# Patient Record
Sex: MALE | NOT HISPANIC OR LATINO
[De-identification: names, ages, dates, MRNs, and addresses within clinical notes are randomized per-mention and may not be internally consistent; named-entity substitution may affect disease eponyms.]

---

## 2017-02-10 ENCOUNTER — RX RENEWAL (OUTPATIENT)
Age: 42
End: 2017-02-10

## 2017-02-10 ENCOUNTER — MEDICATION RENEWAL (OUTPATIENT)
Age: 42
End: 2017-02-10

## 2017-08-14 ENCOUNTER — RX RENEWAL (OUTPATIENT)
Age: 42
End: 2017-08-14

## 2017-08-29 ENCOUNTER — OUTPATIENT (OUTPATIENT)
Dept: OUTPATIENT SERVICES | Facility: HOSPITAL | Age: 42
LOS: 1 days | End: 2017-08-29
Payer: SELF-PAY

## 2017-08-29 ENCOUNTER — LABORATORY RESULT (OUTPATIENT)
Age: 42
End: 2017-08-29

## 2017-08-29 ENCOUNTER — APPOINTMENT (OUTPATIENT)
Dept: INTERNAL MEDICINE | Facility: CLINIC | Age: 42
End: 2017-08-29

## 2017-08-29 VITALS
OXYGEN SATURATION: 99 % | BODY MASS INDEX: 21.97 KG/M2 | HEART RATE: 91 BPM | HEIGHT: 67 IN | SYSTOLIC BLOOD PRESSURE: 136 MMHG | DIASTOLIC BLOOD PRESSURE: 86 MMHG | TEMPERATURE: 98.1 F | RESPIRATION RATE: 18 BRPM | WEIGHT: 140 LBS

## 2017-08-29 DIAGNOSIS — Z00.00 ENCOUNTER FOR GENERAL ADULT MEDICAL EXAMINATION WITHOUT ABNORMAL FINDINGS: ICD-10-CM

## 2017-08-29 PROCEDURE — G0463: CPT

## 2017-08-30 DIAGNOSIS — R13.10 DYSPHAGIA, UNSPECIFIED: ICD-10-CM

## 2017-08-30 DIAGNOSIS — R63.4 ABNORMAL WEIGHT LOSS: ICD-10-CM

## 2017-08-30 DIAGNOSIS — I10 ESSENTIAL (PRIMARY) HYPERTENSION: ICD-10-CM

## 2017-08-30 LAB
25(OH)D3 SERPL-MCNC: 30.4 NG/ML
ALBUMIN SERPL ELPH-MCNC: 4.2 G/DL
ALP BLD-CCNC: 105 U/L
ALT SERPL-CCNC: 58 U/L
ANION GAP SERPL CALC-SCNC: 16 MMOL/L
APPEARANCE: CLEAR
AST SERPL-CCNC: 34 U/L
BASOPHILS # BLD AUTO: 0.01 K/UL
BASOPHILS NFR BLD AUTO: 0.2 %
BILIRUB SERPL-MCNC: 1.1 MG/DL
BILIRUBIN URINE: NEGATIVE
BLOOD URINE: NEGATIVE
BUN SERPL-MCNC: 10 MG/DL
CALCIUM SERPL-MCNC: 10.3 MG/DL
CHLORIDE SERPL-SCNC: 100 MMOL/L
CHOLEST SERPL-MCNC: 81 MG/DL
CHOLEST/HDLC SERPL: 2.3 RATIO
CO2 SERPL-SCNC: 25 MMOL/L
COLOR: YELLOW
CREAT SERPL-MCNC: 0.61 MG/DL
EOSINOPHIL # BLD AUTO: 0.26 K/UL
EOSINOPHIL NFR BLD AUTO: 4.2 %
GLUCOSE QUALITATIVE U: NORMAL MG/DL
GLUCOSE SERPL-MCNC: 95 MG/DL
HBA1C MFR BLD HPLC: 5.5 %
HCT VFR BLD CALC: 45 %
HDLC SERPL-MCNC: 35 MG/DL
HGB BLD-MCNC: 14.5 G/DL
IMM GRANULOCYTES NFR BLD AUTO: 0.2 %
KETONES URINE: NEGATIVE
LDLC SERPL CALC-MCNC: 27 MG/DL
LEUKOCYTE ESTERASE URINE: NEGATIVE
LYMPHOCYTES # BLD AUTO: 2.24 K/UL
LYMPHOCYTES NFR BLD AUTO: 36.2 %
MAN DIFF?: NORMAL
MCHC RBC-ENTMCNC: 27.5 PG
MCHC RBC-ENTMCNC: 32.2 GM/DL
MCV RBC AUTO: 85.2 FL
MONOCYTES # BLD AUTO: 0.69 K/UL
MONOCYTES NFR BLD AUTO: 11.2 %
NEUTROPHILS # BLD AUTO: 2.97 K/UL
NEUTROPHILS NFR BLD AUTO: 48 %
NITRITE URINE: NEGATIVE
PH URINE: 6.5
PLATELET # BLD AUTO: 309 K/UL
POTASSIUM SERPL-SCNC: 5 MMOL/L
PROT SERPL-MCNC: 7.6 G/DL
PROTEIN URINE: NEGATIVE MG/DL
RBC # BLD: 5.28 M/UL
RBC # FLD: 12.7 %
SODIUM SERPL-SCNC: 141 MMOL/L
SPECIFIC GRAVITY URINE: 1.02
T3 SERPL-MCNC: >650 NG/DL
T4 FREE SERPL-MCNC: >7.8 NG/DL
TRIGL SERPL-MCNC: 93 MG/DL
TSH SERPL-ACNC: <0.01 UIU/ML
UROBILINOGEN URINE: NORMAL MG/DL
WBC # FLD AUTO: 6.18 K/UL

## 2017-08-31 ENCOUNTER — APPOINTMENT (OUTPATIENT)
Dept: INTERNAL MEDICINE | Facility: CLINIC | Age: 42
End: 2017-08-31

## 2017-09-13 ENCOUNTER — APPOINTMENT (OUTPATIENT)
Dept: CARDIOLOGY | Facility: CLINIC | Age: 42
End: 2017-09-13
Payer: COMMERCIAL

## 2017-09-13 ENCOUNTER — OUTPATIENT (OUTPATIENT)
Dept: OUTPATIENT SERVICES | Facility: HOSPITAL | Age: 42
LOS: 1 days | End: 2017-09-13
Payer: SELF-PAY

## 2017-09-13 VITALS
WEIGHT: 135 LBS | OXYGEN SATURATION: 99 % | DIASTOLIC BLOOD PRESSURE: 69 MMHG | BODY MASS INDEX: 21.19 KG/M2 | HEART RATE: 76 BPM | RESPIRATION RATE: 16 BRPM | SYSTOLIC BLOOD PRESSURE: 107 MMHG | HEIGHT: 67 IN

## 2017-09-13 DIAGNOSIS — Z00.8 ENCOUNTER FOR OTHER GENERAL EXAMINATION: ICD-10-CM

## 2017-09-13 PROCEDURE — G0463: CPT | Mod: 25

## 2017-09-13 PROCEDURE — ZZZZZ: CPT

## 2017-09-14 DIAGNOSIS — I25.10 ATHEROSCLEROTIC HEART DISEASE OF NATIVE CORONARY ARTERY WITHOUT ANGINA PECTORIS: ICD-10-CM

## 2017-09-14 DIAGNOSIS — E78.5 HYPERLIPIDEMIA, UNSPECIFIED: ICD-10-CM

## 2017-09-14 DIAGNOSIS — E05.90 THYROTOXICOSIS, UNSPECIFIED WITHOUT THYROTOXIC CRISIS OR STORM: ICD-10-CM

## 2017-09-14 DIAGNOSIS — R63.4 ABNORMAL WEIGHT LOSS: ICD-10-CM

## 2017-09-14 DIAGNOSIS — I10 ESSENTIAL (PRIMARY) HYPERTENSION: ICD-10-CM

## 2017-09-15 ENCOUNTER — EMERGENCY (EMERGENCY)
Facility: HOSPITAL | Age: 42
LOS: 1 days | Discharge: HOME | End: 2017-09-15
Attending: EMERGENCY MEDICINE | Admitting: INTERNAL MEDICINE
Payer: SELF-PAY

## 2017-09-15 VITALS
SYSTOLIC BLOOD PRESSURE: 116 MMHG | DIASTOLIC BLOOD PRESSURE: 66 MMHG | TEMPERATURE: 98 F | HEART RATE: 91 BPM | RESPIRATION RATE: 18 BRPM | OXYGEN SATURATION: 99 %

## 2017-09-15 PROCEDURE — 99285 EMERGENCY DEPT VISIT HI MDM: CPT | Mod: 25

## 2017-09-15 PROCEDURE — 93010 ELECTROCARDIOGRAM REPORT: CPT | Mod: 76

## 2017-09-15 NOTE — ED ADULT TRIAGE NOTE - CHIEF COMPLAINT QUOTE
HX of ANGIOPLASTY with Stents. Pt st" I was just tx of hyperthyroidism....tonight I started to sweat a lot and felt short of breath....I felt a chest tight ness around 9:30pm. Better now."

## 2017-09-16 DIAGNOSIS — E05.90 THYROTOXICOSIS, UNSPECIFIED WITHOUT THYROTOXIC CRISIS OR STORM: ICD-10-CM

## 2017-09-16 DIAGNOSIS — R07.9 CHEST PAIN, UNSPECIFIED: ICD-10-CM

## 2017-09-16 DIAGNOSIS — I10 ESSENTIAL (PRIMARY) HYPERTENSION: ICD-10-CM

## 2017-09-16 DIAGNOSIS — Z29.9 ENCOUNTER FOR PROPHYLACTIC MEASURES, UNSPECIFIED: ICD-10-CM

## 2017-09-16 LAB
ALBUMIN SERPL ELPH-MCNC: 3.7 G/DL — SIGNIFICANT CHANGE UP (ref 3.3–5)
ALP SERPL-CCNC: 102 U/L — SIGNIFICANT CHANGE UP (ref 40–120)
ALT FLD-CCNC: 61 U/L — HIGH (ref 4–41)
APTT BLD: 35.5 SEC — SIGNIFICANT CHANGE UP (ref 27.5–37.4)
AST SERPL-CCNC: 31 U/L — SIGNIFICANT CHANGE UP (ref 4–40)
BASOPHILS # BLD AUTO: 0.01 K/UL — SIGNIFICANT CHANGE UP (ref 0–0.2)
BASOPHILS # BLD AUTO: 0.02 K/UL — SIGNIFICANT CHANGE UP (ref 0–0.2)
BASOPHILS NFR BLD AUTO: 0.2 % — SIGNIFICANT CHANGE UP (ref 0–2)
BASOPHILS NFR BLD AUTO: 0.3 % — SIGNIFICANT CHANGE UP (ref 0–2)
BILIRUB SERPL-MCNC: 0.7 MG/DL — SIGNIFICANT CHANGE UP (ref 0.2–1.2)
BUN SERPL-MCNC: 13 MG/DL — SIGNIFICANT CHANGE UP (ref 7–23)
CALCIUM SERPL-MCNC: 9.4 MG/DL — SIGNIFICANT CHANGE UP (ref 8.4–10.5)
CHLORIDE SERPL-SCNC: 101 MMOL/L — SIGNIFICANT CHANGE UP (ref 98–107)
CK MB BLD-MCNC: 1.19 NG/ML — SIGNIFICANT CHANGE UP (ref 1–6.6)
CK MB BLD-MCNC: 1.39 NG/ML — SIGNIFICANT CHANGE UP (ref 1–6.6)
CK SERPL-CCNC: 53 U/L — SIGNIFICANT CHANGE UP (ref 30–200)
CK SERPL-CCNC: 58 U/L — SIGNIFICANT CHANGE UP (ref 30–200)
CK SERPL-CCNC: 91 U/L — SIGNIFICANT CHANGE UP (ref 30–200)
CO2 SERPL-SCNC: 29 MMOL/L — SIGNIFICANT CHANGE UP (ref 22–31)
CREAT SERPL-MCNC: 0.48 MG/DL — LOW (ref 0.5–1.3)
EOSINOPHIL # BLD AUTO: 0.36 K/UL — SIGNIFICANT CHANGE UP (ref 0–0.5)
EOSINOPHIL # BLD AUTO: 0.4 K/UL — SIGNIFICANT CHANGE UP (ref 0–0.5)
EOSINOPHIL NFR BLD AUTO: 5.1 % — SIGNIFICANT CHANGE UP (ref 0–6)
EOSINOPHIL NFR BLD AUTO: 7.3 % — HIGH (ref 0–6)
GLUCOSE SERPL-MCNC: 99 MG/DL — SIGNIFICANT CHANGE UP (ref 70–99)
HCT VFR BLD CALC: 39.2 % — SIGNIFICANT CHANGE UP (ref 39–50)
HCT VFR BLD CALC: 40.3 % — SIGNIFICANT CHANGE UP (ref 39–50)
HGB BLD-MCNC: 12.9 G/DL — LOW (ref 13–17)
HGB BLD-MCNC: 13.5 G/DL — SIGNIFICANT CHANGE UP (ref 13–17)
IMM GRANULOCYTES # BLD AUTO: 0 # — SIGNIFICANT CHANGE UP
IMM GRANULOCYTES # BLD AUTO: 0.01 # — SIGNIFICANT CHANGE UP
IMM GRANULOCYTES NFR BLD AUTO: 0 % — SIGNIFICANT CHANGE UP (ref 0–1.5)
IMM GRANULOCYTES NFR BLD AUTO: 0.1 % — SIGNIFICANT CHANGE UP (ref 0–1.5)
INR BLD: 1.05 — SIGNIFICANT CHANGE UP (ref 0.88–1.17)
LYMPHOCYTES # BLD AUTO: 1.8 K/UL — SIGNIFICANT CHANGE UP (ref 1–3.3)
LYMPHOCYTES # BLD AUTO: 2.6 K/UL — SIGNIFICANT CHANGE UP (ref 1–3.3)
LYMPHOCYTES # BLD AUTO: 33 % — SIGNIFICANT CHANGE UP (ref 13–44)
LYMPHOCYTES # BLD AUTO: 36.6 % — SIGNIFICANT CHANGE UP (ref 13–44)
MCHC RBC-ENTMCNC: 27.7 PG — SIGNIFICANT CHANGE UP (ref 27–34)
MCHC RBC-ENTMCNC: 28.2 PG — SIGNIFICANT CHANGE UP (ref 27–34)
MCHC RBC-ENTMCNC: 32.9 % — SIGNIFICANT CHANGE UP (ref 32–36)
MCHC RBC-ENTMCNC: 33.5 % — SIGNIFICANT CHANGE UP (ref 32–36)
MCV RBC AUTO: 84.1 FL — SIGNIFICANT CHANGE UP (ref 80–100)
MCV RBC AUTO: 84.3 FL — SIGNIFICANT CHANGE UP (ref 80–100)
MONOCYTES # BLD AUTO: 0.7 K/UL — SIGNIFICANT CHANGE UP (ref 0–0.9)
MONOCYTES # BLD AUTO: 0.86 K/UL — SIGNIFICANT CHANGE UP (ref 0–0.9)
MONOCYTES NFR BLD AUTO: 12.1 % — SIGNIFICANT CHANGE UP (ref 2–14)
MONOCYTES NFR BLD AUTO: 12.8 % — SIGNIFICANT CHANGE UP (ref 2–14)
NEUTROPHILS # BLD AUTO: 2.54 K/UL — SIGNIFICANT CHANGE UP (ref 1.8–7.4)
NEUTROPHILS # BLD AUTO: 3.26 K/UL — SIGNIFICANT CHANGE UP (ref 1.8–7.4)
NEUTROPHILS NFR BLD AUTO: 45.8 % — SIGNIFICANT CHANGE UP (ref 43–77)
NEUTROPHILS NFR BLD AUTO: 46.7 % — SIGNIFICANT CHANGE UP (ref 43–77)
NRBC # FLD: 0 — SIGNIFICANT CHANGE UP
NRBC # FLD: 0 — SIGNIFICANT CHANGE UP
NT-PROBNP SERPL-SCNC: 103.9 PG/ML — SIGNIFICANT CHANGE UP
PLATELET # BLD AUTO: 222 K/UL — SIGNIFICANT CHANGE UP (ref 150–400)
PLATELET # BLD AUTO: 236 K/UL — SIGNIFICANT CHANGE UP (ref 150–400)
PMV BLD: 10 FL — SIGNIFICANT CHANGE UP (ref 7–13)
PMV BLD: 9.8 FL — SIGNIFICANT CHANGE UP (ref 7–13)
POTASSIUM SERPL-MCNC: 4.7 MMOL/L — SIGNIFICANT CHANGE UP (ref 3.5–5.3)
POTASSIUM SERPL-SCNC: 4.7 MMOL/L — SIGNIFICANT CHANGE UP (ref 3.5–5.3)
PROT SERPL-MCNC: 6.7 G/DL — SIGNIFICANT CHANGE UP (ref 6–8.3)
PROTHROM AB SERPL-ACNC: 11.8 SEC — SIGNIFICANT CHANGE UP (ref 9.8–13.1)
RBC # BLD: 4.66 M/UL — SIGNIFICANT CHANGE UP (ref 4.2–5.8)
RBC # BLD: 4.78 M/UL — SIGNIFICANT CHANGE UP (ref 4.2–5.8)
RBC # FLD: 12.3 % — SIGNIFICANT CHANGE UP (ref 10.3–14.5)
RBC # FLD: 12.3 % — SIGNIFICANT CHANGE UP (ref 10.3–14.5)
SODIUM SERPL-SCNC: 139 MMOL/L — SIGNIFICANT CHANGE UP (ref 135–145)
T3 SERPL-MCNC: 583 NG/DL — HIGH (ref 80–200)
T4 FREE SERPL-MCNC: 7.57 NG/DL — HIGH (ref 0.9–1.8)
TROPONIN T SERPL-MCNC: < 0.06 NG/ML — SIGNIFICANT CHANGE UP (ref 0–0.06)
TSH SERPL-MCNC: 0.01 UIU/ML — LOW (ref 0.27–4.2)
WBC # BLD: 5.45 K/UL — SIGNIFICANT CHANGE UP (ref 3.8–10.5)
WBC # BLD: 7.11 K/UL — SIGNIFICANT CHANGE UP (ref 3.8–10.5)
WBC # FLD AUTO: 5.45 K/UL — SIGNIFICANT CHANGE UP (ref 3.8–10.5)
WBC # FLD AUTO: 7.11 K/UL — SIGNIFICANT CHANGE UP (ref 3.8–10.5)

## 2017-09-16 PROCEDURE — 93306 TTE W/DOPPLER COMPLETE: CPT | Mod: 26

## 2017-09-16 RX ORDER — ASPIRIN/CALCIUM CARB/MAGNESIUM 324 MG
162 TABLET ORAL ONCE
Qty: 0 | Refills: 0 | Status: COMPLETED | OUTPATIENT
Start: 2017-09-16 | End: 2017-09-16

## 2017-09-16 RX ORDER — METOPROLOL TARTRATE 50 MG
25 TABLET ORAL EVERY 12 HOURS
Qty: 0 | Refills: 0 | Status: DISCONTINUED | OUTPATIENT
Start: 2017-09-16 | End: 2017-09-16

## 2017-09-16 RX ORDER — ATORVASTATIN CALCIUM 80 MG/1
20 TABLET, FILM COATED ORAL AT BEDTIME
Qty: 0 | Refills: 0 | Status: DISCONTINUED | OUTPATIENT
Start: 2017-09-16 | End: 2017-09-17

## 2017-09-16 RX ORDER — ASPIRIN/CALCIUM CARB/MAGNESIUM 324 MG
81 TABLET ORAL DAILY
Qty: 0 | Refills: 0 | Status: DISCONTINUED | OUTPATIENT
Start: 2017-09-16 | End: 2017-09-17

## 2017-09-16 RX ORDER — METOPROLOL TARTRATE 50 MG
50 TABLET ORAL
Qty: 0 | Refills: 0 | Status: DISCONTINUED | OUTPATIENT
Start: 2017-09-16 | End: 2017-09-16

## 2017-09-16 RX ORDER — METOPROLOL TARTRATE 50 MG
25 TABLET ORAL
Qty: 0 | Refills: 0 | Status: DISCONTINUED | OUTPATIENT
Start: 2017-09-16 | End: 2017-09-17

## 2017-09-16 RX ORDER — ASPIRIN/CALCIUM CARB/MAGNESIUM 324 MG
325 TABLET ORAL ONCE
Qty: 0 | Refills: 0 | Status: DISCONTINUED | OUTPATIENT
Start: 2017-09-16 | End: 2017-09-16

## 2017-09-16 RX ADMIN — Medication 25 MILLIGRAM(S): at 17:51

## 2017-09-16 RX ADMIN — ATORVASTATIN CALCIUM 20 MILLIGRAM(S): 80 TABLET, FILM COATED ORAL at 22:31

## 2017-09-16 RX ADMIN — Medication 162 MILLIGRAM(S): at 01:22

## 2017-09-16 RX ADMIN — Medication 81 MILLIGRAM(S): at 11:57

## 2017-09-16 NOTE — H&P ADULT - HISTORY OF PRESENT ILLNESS
This is a 43 yo m PMH HTN, CAD s/p stent x 1 2013, recent diagnosis of hyperthyroidism PW mid sternal chest pain that began after eating dinner while driving his car last PM. Pt states pain felt like a band around his chest 5/10 with mild SOB, diaphoresis and palpitations. Pt states he went to his apartment where he took his metoprolol and atorvastatin. Pt's pain was unchanged and gradually disappeared after thirty minutes. Pt presently is pain free without complaints. Pt denies any previous episodes of chest pain since stent placement. Pt states he was diagnosed with hyperthyroidism two weeks ago and was to see an endocrinologist in October. Pt states he has been having increased heart rate to low 100's and palpitations and thirty  pound weight lose starting in June of this year.

## 2017-09-16 NOTE — H&P ADULT - ASSESSMENT
This is a 41 yo PMH Htn, CAD, hyperthyroidism who presents with mid sternal chest pain for 30 min last night. Pt's pain was relived after 30 min.

## 2017-09-16 NOTE — CONSULT NOTE ADULT - ASSESSMENT
43 y/o male with acute thyrotoxicosis, tachycardia, HTN, HLD, and history of CAD s/p 1 stent here with c/o chest pain, palpitations, and diaphoresis.
42yoM with PMHx CAD with stents, HTN and hyperthryoidism (awaiting treatment by endocrinologist) p.w chest tightness a/w SOB and diaphoresis lasting for 20-30 minutes.

## 2017-09-16 NOTE — H&P ADULT - NSHPLABSRESULTS_GEN_ALL_CORE
EKG NSR no INA EKG NSR no INA    < from: Transthoracic Echocardiogram (09.16.17 @ 07:41) >    Patient name: LIANA SANTA  YOB: 1975   Age: 42 (M)   MR#: [id number]  Study Date: 9/16/2017  [facility]  Study quality: Technically good  Referring Physician: Alonzo Chávez MD  Blood Pressure: 118/75 mmHg  Height: 170 cm  Weight: 61 kg  BSA: 1.7 m2  ------------------------------------------------------------------------  PROCEDURE: Transthoracic echocardiogram with 2-D, M-Mode  and complete spectral and color flow Doppler.  INDICATION: Chest pain, unspecified (R07.9)  ------------------------------------------------------------------------  DIMENSIONS:  Dimensions:     Normal Values:  LA:     3.3 cm    2.0 - 4.0 cm  Ao:     3.4 cm    2.0 - 3.8 cm  SEPTUM: 1.1 cm    0.6 - 1.2 cm  PWT:    1.0 cm    0.6 - 1.1 cm  LVIDd:  4.3 cm    3.0 - 5.6 cm  LVIDs:  2.4 cm    1.8 - 4.0 cm  Derived Variables:  LVMI: 89 g/m2  RWT: 0.46  Fractional short: 44 %  Ejection Fraction (Teicholtz): 76 %  ------------------------------------------------------------------------  OBSERVATIONS:  Mitral Valve: Normal mitral valve.  Aortic Root: Normal aortic root.  Aortic Valve: Normal trileaflet aortic valve.  Left Atrium: Normal left atrium.  LA volume index = 34  cc/m2.  Left Ventricle: Normal left ventricularsystolic function.  No segmental wall motion abnormalities. Normal left  ventricular internal dimensions and wall thicknesses.  Right Heart: Normal right atrium. Normal right ventricular  size and function. Normal tricuspid valve. Normal pulmonic  valve.  Pericardium/PleuraNormal pericardium with no pericardial  effusion.  ------------------------------------------------------------------------  CONCLUSIONS:  1. Normal left ventricular internal dimensions and wall  thicknesses.  2. Normal left ventricular systolic function. No segmental  wall motion abnormalities.  3. Normal right ventricular size and function.  *** No previous Echo exam.  ------------------------------------------------------------------------  Confirmed on  9/16/2017 - 10:16:58 by Alonzo Chávez MD, Merged with [facility],  NATASHA, VI  ------------------------------------------------------------------------    < end of copied text >

## 2017-09-16 NOTE — CONSULT NOTE ADULT - PROBLEM SELECTOR RECOMMENDATION 9
Given patient is chest pain free and initial symptoms were self limiting but has history of CAD would recommend to trend CE   EKG if chest pain   Some of symptoms consistent with hyperthyroidism, follow up with endocrinologist   TTE in AM to evaluate for structural abnormalities TTE to assess LV function given hyperthyroid state.    Endocrine evaluation

## 2017-09-16 NOTE — CONSULT NOTE ADULT - SUBJECTIVE AND OBJECTIVE BOX
Reason For Consult: Hyperthyroidism    HPI: This is a 41 yo m PMH HTN, CAD s/p stent x 1 2013, recent diagnosis of hyperthyroidism PW mid sternal chest pain that began after eating dinner while driving his car last PM. Pt states pain felt like a band around his chest 5/10 with mild SOB, diaphoresis and palpitations. Pt states he went to his apartment where he took his metoprolol and atorvastatin. Pt's pain was unchanged and gradually disappeared after thirty minutes. Pt presently is pain free without complaints. Pt denies any previous episodes of chest pain since stent placement. Pt states he was diagnosed with hyperthyroidism two weeks ago and was to see an endocrinologist in October. Pt states he has been having increased heart rate to low 100's and palpitations and thirty  pound weight lose starting in June of this year. (16 Sep 2017 08:41)    Endocrine History: 43 y/o male with recently diagnosed hyperthyroidism by PMD presented to [facility] with complaints of chest pain, palpitations, SOB, diaphoresis. He complained of 30 lbs weight loss since May 2017 with tremors, occasional palpitations, frequent BM and PMD diagnosed him with hyperthyroidism based on labs and was advised to schedule an endocrinology appointment. He scheduled an appointment with an endocrinologist at [facility] for October. He was not given any Methimazole. He has been on Metoprolol since 2013 and takes 1/2 tab 25mg BID at home. He states baseline HR is 86 and lately very labile and jumps to 100. He continues to have tremors and complaints of leg heaviness and inability to quickly climb down stairs as he did before. No paralysis. He admits to heat intolerance. Diarrhea has resolved. He admits to dysphagia and hoarseness. No goiter in the past. No h/o neck radiation or no h/o lithium or steroid use. No neck pain, no recent URI symptoms. No recent travels. Not taking any OTC herbal supplements. Was taking Vitamin E in May and discontinued it in 1 month after tapering it. Eats protein bars from grocery store. No h/o IV exposure in the last 6 months. No hair loss. Skin is flushed. No problems with nails. Admits to a family history of hyperthyroidism in Mom and Sister but does not know cause or medical course.       PAST MEDICAL & SURGICAL HISTORY:  Coronary artery disease involving native coronary artery, angina presence unspecified, unspecified whether native or transplanted heart  Hypertension  No significant past surgical history      FAMILY HISTORY:  Family history of hyperthyroidism (Mother, Sibling): Pt&#x27;s Mother and sister. Sister gained weight, unclear if Graves or Hashimotos.  Family history of premature CAD    Social History: No tobacco, occasional alcohol, no illicit drug use. Rarely drinks caffeine. Works in IT. Currently has no health insurance    Outpatient Medications: Metoprolol 25mg BID (1/2 tablet), Atorvastatin 20mg QHS, Aspirin 81mg QD, Lotrel (benazapril-amlodine) 5-10mg QD    MEDICATIONS  (STANDING):  metoprolol 25 milliGRAM(s) Oral every 12 hours  atorvastatin 20 milliGRAM(s) Oral at bedtime  aspirin enteric coated 81 milliGRAM(s) Oral daily    MEDICATIONS  (PRN):      Allergies  No Known Allergies    Review of Systems:  Constitutional: No fever, + 30 lb weight loss since May 2017  Eyes: No blurry vision, no eye pain, no orbitopathy symptoms  Neuro: + tremors  HEENT: + dysphagia and hoarseness  Cardiovascular: + chest pain, palpitations  Respiratory: No SOB, no cough  GI: No nausea, vomiting, abdominal pain  : No dysuria  Skin: no rash  Psych: no depression  Endocrine: no polyuria, polydipsia  Hem/lymph: no swelling  Osteoporosis: no fractures    ALL OTHER SYSTEMS REVIEWED AND NEGATIVE    PHYSICAL EXAM:  VITALS: T(C): 36.9 (09-16-17 @ 10:14)  T(F): 98.4 (09-16-17 @ 10:14), Max: 98.7 (09-16-17 @ 06:54)  HR: 100 (09-16-17 @ 10:14) (81 - 100)  BP: 121/67 (09-16-17 @ 10:14) (113/70 - 121/67)  RR:  (18 - 20)  SpO2:  (99% - 100%)  GENERAL: NAD, well-groomed, well-developed, thin anxious appearing male  EYES: No proptosis, no lid lag, anicteric. No exopthalmos.  HEENT:  Atraumatic, Normocephalic, moist mucous membranes. Small thin neck. No goiter on exam  THYROID: Normal size, no palpable nodules. Small palpable thrill. No bruit appreciated   RESPIRATORY: Clear to auscultation bilaterally; No rales, rhonchi, wheezing, or rubs  CARDIOVASCULAR: Tachycardia with regular rhythm; No murmurs; no peripheral edema  GI: Soft, nontender, non distended, normal bowel sounds  SKIN: Dry, intact, No rashes or lesions. Skin has generalized erythema and appears flushed  MUSCULOSKELETAL: Full range of motion, MS 4/5 in all ext  NEURO: sensation intact, extraocular movements intact, + fine tremor, normal reflexes  PSYCH: Alert and oriented x 3, normal affect, normal mood, pressured speech, appears anxious                              12.9   5.45  )-----------( 222      ( 16 Sep 2017 07:22 )             39.2       09-16    139  |  101  |  13  ----------------------------<  99  4.7   |  29  |  0.48<L>    EGFR if : 160  EGFR if non : 138    Ca    9.4      09-16    TPro  6.7  /  Alb  3.7  /  TBili  0.7  /  DBili  x   /  AST  31  /  ALT  61<H>  /  AlkPhos  102  09-16      Thyroid Function Tests:  09-16 @ 07:00   FreeT4 7.57   T3 583.0   TSH 0.01

## 2017-09-16 NOTE — ED ADULT NURSE NOTE - OBJECTIVE STATEMENT
the patient is a 42 year old male a&ox4 presenting with a c/c of sob and chest pain with excertion.  patient has hx of cardiac stent placed in 2013.  patient reported tonight he became short of breath after walking one block which is a change from his baseline.  he reported at this time he experienced tightness in the chest and diaphoresis.  at present patient reports the symptoms have resolved.  currently denying cp/sob, n/v/d, fevers/chills.  no diaphoresis noted.  vss, patient in no acute distress at this time, MD presently evaluating, will continue to monitor.

## 2017-09-16 NOTE — ED PROVIDER NOTE - OBJECTIVE STATEMENT
The patient is a 42 Y.O. male with pmh of CAD s/p stents in 13, HTH, recently dx hyperthyroid disease who presents with chest pain.     Has been chest pain free since 2013 since stents. Usually walks up 4 flights of stairs without chest pain or significant sob. Was driving today, felt sob, with some diaphoresis, chest pain like a band across his chest for 30 min that resolved spontaneously. Has been compliant with his meds.  Stress test in 2016 was pending out patient work up. Also recent dx with hyperthyroidism but workup not complete and has not been treated yet.     Cards Alonzo Chávez  PMD Veronique Vera.

## 2017-09-16 NOTE — H&P ADULT - ATTENDING COMMENTS
42M CAD hyperthyroidism HTN with palpitations dyspnea and chest pressure x 20 min, sx resolved  --echo normal  --CE - x2  --HR control with beta blocker  --needs endocrine f/u for hyperthyroidism  --may be stable for discharge after 3rd negative CE (due 3pm), please discuss with cardiology

## 2017-09-16 NOTE — H&P ADULT - FAMILY HISTORY
Family history of premature CAD     Mother  Still living? Unknown  Family history of hyperthyroidism, Age at diagnosis: Age Unknown     Sibling  Still living? Unknown  Family history of hyperthyroidism, Age at diagnosis: Age Unknown

## 2017-09-16 NOTE — ED PROVIDER NOTE - ATTENDING CONTRIBUTION TO CARE
I performed a face-to-face evaluation of the patient and performed a history and physical examination. I agree with the history and physical examination.    Brisa: 42 M, coronary stent, p/w 30-min CP (burning across chest), sweating, SOB. Usually has no exercise tolerance. Appears well. NAD. Afebrile. Vital signs unremarkable. Strong pulse. Respirations unlabored. No LE edema. No orthopnea. No VTE risks. Concern for ACS. Plan: ASA, EKG, labs, likely CDU for stress test and echo.

## 2017-09-16 NOTE — CONSULT NOTE ADULT - SUBJECTIVE AND OBJECTIVE BOX
CHIEF COMPLAINT: Chest tightness     HISTORY OF PRESENT ILLNESS:  This is a 42yoM with PMHx CAD with stents, HTN and hyperthryoidism (awaiting treatment by endocrinologist) p.w chest tightness a/w SOB and diaphoresis lasting for 20-30 minutes.  Also reports palpitations, shaky hands and intermittent diarrhea.  Upon arrival to ED, symptoms resolved on its own.  First set of enzymes were negative.    Allergies  No Known Allergies    MEDICATIONS: copied from Monitoring Division     CurrentMeds_4_twCiteListControlStart Amlodipine Besy-Benazepril HCl - 5-10 MG Oral Capsule; TAKE ONE CAPSULE BY  MOUTH EVERY DAY  Atorvastatin Calcium 20 MG Oral Tablet; TAKE 1 TABLET BY MOUTH EVERY DAY  Metoprolol Tartrate 25 MG Oral Tablet; TAKE 1/2 TABLET BY MOUTH TWICE DAILY    PAST MEDICAL & SURGICAL HISTORY:  Coronary artery disease involving native coronary artery, angina presence unspecified, unspecified whether native or transplanted heart  Hypertension  No significant past surgical history    FAMILY HISTORY:  Family history of premature CAD    SOCIAL HISTORY:    Social alcohol; denies cigarette or illicit drug use    REVIEW OF SYSTEMS:  See HPI. Otherwise, 10 point ROS done and otherwise negative.    PHYSICAL EXAM:  T(C): 36.8 (09-16-17 @ 00:54), Max: 36.8 (09-16-17 @ 00:54)  HR: 81 (09-16-17 @ 02:45) (81 - 91)  BP: 119/62 (09-16-17 @ 02:45) (113/70 - 119/62)  RR: 20 (09-16-17 @ 02:45) (18 - 20)  SpO2: 100% (09-16-17 @ 02:45) (99% - 100%)  Wt(kg): --  I&O's Summary    Appearance: Normal	  HEENT:   Normal oral mucosa, PERRL, EOMI	  Lymphatic: No lymphadenopathy  Cardiovascular: Normal S1 S2, No JVD, No murmurs, No edema  Respiratory: Lungs clear to auscultation	  Psychiatry: A & O x 3, Mood & affect appropriate  Gastrointestinal:  Soft, Non-tender, + BS	  Skin: No rashes, No ecchymoses, No cyanosis	  Neurologic: Non-focal  Extremities: Normal range of motion, No clubbing, cyanosis or edema  Vascular: Peripheral pulses palpable 2+ bilaterally    LABS:	 	  CBC Full  -  ( 16 Sep 2017 00:54 )  WBC Count : 7.11 K/uL  Hemoglobin : 13.5 g/dL  Hematocrit : 40.3 %  Platelet Count - Automated : 236 K/uL  Mean Cell Volume : 84.3 fL  Mean Cell Hemoglobin : 28.2 pg  Mean Cell Hemoglobin Concentration : 33.5 %  Auto Neutrophil # : 3.26 K/uL  Auto Lymphocyte # : 2.60 K/uL  Auto Monocyte # : 0.86 K/uL  Auto Eosinophil # : 0.36 K/uL  Auto Basophil # : 0.02 K/uL  Auto Neutrophil % : 45.8 %  Auto Lymphocyte % : 36.6 %  Auto Monocyte % : 12.1 %  Auto Eosinophil % : 5.1 %  Auto Basophil % : 0.3 %    09-16    139  |  101  |  13  ----------------------------<  99  4.7   |  29  |  0.48<L>    Ca    9.4      16 Sep 2017 00:54    TPro  6.7  /  Alb  3.7  /  TBili  0.7  /  DBili  x   /  AST  31  /  ALT  61<H>  /  AlkPhos  102  09-16    proBNP: Serum Pro-Brain Natriuretic Peptide: 103.9 pg/mL (09-16 @ 00:54)    TSH: Thyroid Stimulating Hormone, Serum: 0.01 uIU/mL (09-16 @ 00:54)    EKG: NSR; no INA    CARDIAC MARKERS ( 16 Sep 2017 00:54 )  x     / < 0.06 ng/mL / 91 u/L / 1.19 ng/mL / x CHIEF COMPLAINT: Chest tightness     HISTORY OF PRESENT ILLNESS:    Patient known to Dr. Alonzo Chávez from the [place] office.    This is a 42yoM with PMHx CAD with stents, HTN and hyperthryoidism (awaiting treatment by endocrinologist) p.w chest tightness, SOB and diaphoresis lasting for 20-30 minutes.  Also reports palpitations, shaky hands and intermittent diarrhea.  He contacted Dr. Chávez who told him to go to the ER.  Upon arrival to ED, symptoms resolved on its own.  First set of enzymes were negative.    Of significance, he has lost significant weight over the past several months.    Plan for echocardiogram to evaluate LV function (in the presence of underlying hyperthyroid state) and endocrine evaluation.        Allergies  No Known Allergies    MEDICATIONS: copied from TokBoxs_4_twCiteListControlStart Amlodipine Besy-Benazepril HCl - 5-10 MG Oral Capsule; TAKE ONE CAPSULE BY  MOUTH EVERY DAY  Atorvastatin Calcium 20 MG Oral Tablet; TAKE 1 TABLET BY MOUTH EVERY DAY  Metoprolol Tartrate 25 MG Oral Tablet; TAKE 1/2 TABLET BY MOUTH TWICE DAILY    PAST MEDICAL & SURGICAL HISTORY:  Coronary artery disease involving native coronary artery, angina presence unspecified, unspecified whether native or transplanted heart  Hypertension  No significant past surgical history    FAMILY HISTORY:  Family history of premature CAD    SOCIAL HISTORY:    Social alcohol; denies cigarette or illicit drug use    REVIEW OF SYSTEMS:  See HPI. Otherwise, 10 point ROS done and otherwise negative.    PHYSICAL EXAM:  T(C): 36.8 (09-16-17 @ 00:54), Max: 36.8 (09-16-17 @ 00:54)  HR: 81 (09-16-17 @ 02:45) (81 - 91)  BP: 119/62 (09-16-17 @ 02:45) (113/70 - 119/62)  RR: 20 (09-16-17 @ 02:45) (18 - 20)  SpO2: 100% (09-16-17 @ 02:45) (99% - 100%)  Wt(kg): --  I&O's Summary    Appearance: Normal	  HEENT:   Normal oral mucosa, PERRL, EOMI	  Lymphatic: No lymphadenopathy  Cardiovascular: Normal S1 S2, No JVD, No murmurs, No edema  Respiratory: Lungs clear to auscultation	  Psychiatry: A & O x 3, Mood & affect appropriate  Gastrointestinal:  Soft, Non-tender, + BS	  Skin: No rashes, No ecchymoses, No cyanosis	  Neurologic: Non-focal  Extremities: Normal range of motion, No clubbing, cyanosis or edema  Vascular: Peripheral pulses palpable 2+ bilaterally    LABS:	 	  CBC Full  -  ( 16 Sep 2017 00:54 )  WBC Count : 7.11 K/uL  Hemoglobin : 13.5 g/dL  Hematocrit : 40.3 %  Platelet Count - Automated : 236 K/uL  Mean Cell Volume : 84.3 fL  Mean Cell Hemoglobin : 28.2 pg  Mean Cell Hemoglobin Concentration : 33.5 %  Auto Neutrophil # : 3.26 K/uL  Auto Lymphocyte # : 2.60 K/uL  Auto Monocyte # : 0.86 K/uL  Auto Eosinophil # : 0.36 K/uL  Auto Basophil # : 0.02 K/uL  Auto Neutrophil % : 45.8 %  Auto Lymphocyte % : 36.6 %  Auto Monocyte % : 12.1 %  Auto Eosinophil % : 5.1 %  Auto Basophil % : 0.3 %    09-16    139  |  101  |  13  ----------------------------<  99  4.7   |  29  |  0.48<L>    Ca    9.4      16 Sep 2017 00:54    TPro  6.7  /  Alb  3.7  /  TBili  0.7  /  DBili  x   /  AST  31  /  ALT  61<H>  /  AlkPhos  102  09-16    proBNP: Serum Pro-Brain Natriuretic Peptide: 103.9 pg/mL (09-16 @ 00:54)    TSH: Thyroid Stimulating Hormone, Serum: 0.01 uIU/mL (09-16 @ 00:54)    EKG: NSR; no INA    CARDIAC MARKERS ( 16 Sep 2017 00:54 )  x     / < 0.06 ng/mL / 91 u/L / 1.19 ng/mL / x

## 2017-09-16 NOTE — CONSULT NOTE ADULT - PROBLEM SELECTOR RECOMMENDATION 9
- Recommend inpatient admission due to symptoms of chest pain, ongoing palpitations, nervousness, diaphoresis, tremors, flushed appearance, and leg weakness/fatigue in the setting of acute thyrotoxicosis. He is not in thyroid storm or impending storm but has risks of worsening given high FT4 levels.  - Differential diagnosis includes Graves Disease (most likely), toxic nodule, painless thyroiditis (less likely given onset was months ago)  - Recommend check TSH Receptor Ab, TSI, and TPO Ab   - Recommend check thyroid U/S and NM thyroid uptake and scan  - Recommend increase Metoprolol to 50mg BID  - Recommend start Methimazole 30mg QD and recheck FT4 in 2 days  - Risk of agranulocytosis and transaminitis discussed with the patient and spouse    Outpatient Plan  - Final doses of Methimazole and Metoprolol to be determined  - F/u [facility] [phone] - Recommend inpatient admission due to symptoms of chest pain, ongoing palpitations, nervousness, diaphoresis, tremors, flushed appearance, and leg weakness/fatigue in the setting of acute thyrotoxicosis. He is not in thyroid storm or impending storm but has risks of worsening given high FT4 levels.  - Differential diagnosis includes Graves Disease (most likely), toxic nodule, painless thyroiditis (less likely given onset was months ago)  - Recommend check TSH Receptor Ab, TSI, and TPO Ab   - Recommend check thyroid U/S and NM thyroid uptake and scan as outpatient  - Recommend increase Metoprolol to 50mg BID  - Recommend start Methimazole 20mg BID in the acute settings, can likely decrease to daily dosing upon discharge and recheck FT4 in 2 days  - Risk of agranulocytosis and transaminitis discussed with the patient and spouse    Outpatient Plan  - Final doses of Methimazole and Metoprolol to be determined  - F/u [facility] [phone]

## 2017-09-16 NOTE — ED PROVIDER NOTE - PMH
Coronary artery disease involving native coronary artery, angina presence unspecified, unspecified whether native or transplanted heart    Hypertension

## 2017-09-16 NOTE — ED PROVIDER NOTE - MEDICAL DECISION MAKING DETAILS
Brisa: 42 M, coronary stent, p/w 30-min CP (burning across chest), sweating, SOB. Usually has no exercise tolerance. Appears well. NAD. Afebrile. Vital signs unremarkable. Strong pulse. Respirations unlabored. No LE edema. No orthopnea. No VTE risks. Concern for ACS. Plan: ASA, EKG, labs, likely CDU for stress test and echo. Brisa: 42 M, coronary stent, p/w 30-min CP (burning across chest), sweating, SOB. Usually has no exercise tolerance. Appears well. NAD. Afebrile. Vital signs unremarkable. Strong pulse. Respirations unlabored. No LE edema. No orthopnea. No VTE risks. Concern for ACS. Plan: ASA, EKG, labs, likely CDU for stress test and echo.  Spoke with NP covering for Dr. Chávez. Admit to medcine/tele, repeat CE, Echo, Will follow in the am.

## 2017-09-17 ENCOUNTER — TRANSCRIPTION ENCOUNTER (OUTPATIENT)
Age: 42
End: 2017-09-17

## 2017-09-17 VITALS
OXYGEN SATURATION: 100 % | RESPIRATION RATE: 20 BRPM | DIASTOLIC BLOOD PRESSURE: 77 MMHG | SYSTOLIC BLOOD PRESSURE: 135 MMHG | HEART RATE: 90 BPM | TEMPERATURE: 98 F

## 2017-09-17 LAB
THYROPEROXIDASE AB SERPL-ACNC: 44.4 IU/ML — HIGH (ref 0–34)
TROPONIN T SERPL-MCNC: < 0.06 NG/ML — SIGNIFICANT CHANGE UP (ref 0–0.06)

## 2017-09-17 RX ORDER — METHIMAZOLE 10 MG/1
2 TABLET ORAL
Qty: 120 | Refills: 0 | OUTPATIENT
Start: 2017-09-17 | End: 2017-10-17

## 2017-09-17 RX ORDER — METOPROLOL TARTRATE 50 MG
1 TABLET ORAL
Qty: 60 | Refills: 0 | OUTPATIENT
Start: 2017-09-17 | End: 2017-10-17

## 2017-09-17 RX ORDER — ASPIRIN/CALCIUM CARB/MAGNESIUM 324 MG
1 TABLET ORAL
Qty: 0 | Refills: 0 | COMMUNITY
Start: 2017-09-17

## 2017-09-17 RX ORDER — INFLUENZA VIRUS VACCINE 15; 15; 15; 15 UG/.5ML; UG/.5ML; UG/.5ML; UG/.5ML
0.5 SUSPENSION INTRAMUSCULAR ONCE
Qty: 0 | Refills: 0 | Status: COMPLETED | OUTPATIENT
Start: 2017-09-17 | End: 2017-09-17

## 2017-09-17 RX ADMIN — Medication 25 MILLIGRAM(S): at 07:06

## 2017-09-17 RX ADMIN — Medication 81 MILLIGRAM(S): at 13:24

## 2017-09-17 NOTE — DISCHARGE NOTE ADULT - HOSPITAL COURSE
41 yo m PMH CAD s/p stent x 1 2013 ,HTN, recent diag hyperthyroid 2 weeks ago, PW mid sternal CP with palpitations and mild SOB after eating and driving on 9/15  pt ruled out for MI by serial enzymes.  Endocrine cs called-- recommended methmizole and metoprolol, get thyroid US and follow up with Dr Gaines.  As per endocrine, pt can not fly on Friday, needs to stay local until he follows up with her 41 yo m PMH CAD s/p stent x 1 2013 ,HTN, recent diag hyperthyroid 2 weeks ago, PW mid sternal CP with palpitations and mild SOB after eating and driving on 9/15  pt ruled out for MI by serial enzymes.  Endocrine cs called-- recommended methmizole and metoprolol, get thyroid US and follow up with Dr Gaines.  As per endocrine, pt can not fly on Friday, needs to stay local until he follows up with her    TTE 9/16  EF 76%  1. Normal left ventricular internal dimensions and wall  thicknesses.  2. Normal left ventricular systolic function. No segmental  wall motion abnormalities.  3. Normal right ventricular size and function.    CE neg x2  Echo 9/16 nl study  CXR clear lungs     Patient currently denies chest pain, sob, palpitations, dizziness or lightheadedness. Case d/w attending (Dr. Lopez ): Pt to be dc'd home and F/U with his PCP and Endocrinologist as instructed.

## 2017-09-17 NOTE — DISCHARGE NOTE ADULT - MEDICATION SUMMARY - MEDICATIONS TO TAKE
I will START or STAY ON the medications listed below when I get home from the hospital:    aspirin 81 mg oral delayed release tablet  -- 1 tab(s) by mouth once a day  -- Indication: For Chest pain    atorvastatin  -- Indication: For Hyperlipidemia    Lotrel  -- Indication: For Hypertension    methIMAzole 10 mg oral tablet  -- 2 tab(s) by mouth 2 times a day  -- Indication: For Thyrotoxicosis without thyroid storm, unspecified thyrotoxicosis type    metoprolol tartrate 25 mg oral tablet  -- 1 tab(s) by mouth 2 times a day  -- Indication: For Thyrotoxicosis without thyroid storm, unspecified thyrotoxicosis type

## 2017-09-17 NOTE — DISCHARGE NOTE ADULT - PLAN OF CARE
cont medications as prescribed you need to follow up with Dr Gaines within 1-2 weeks as discussed  you need to obtain thyroid US as well. low salt diet Take medications as instructed on discharge  Follow up with your PCP within 1 week

## 2017-09-17 NOTE — DISCHARGE NOTE ADULT - CARE PLAN
Principal Discharge DX:	Thyrotoxicosis without thyroid storm, unspecified thyrotoxicosis type  Goal:	cont medications as prescribed  Instructions for follow-up, activity and diet:	you need to follow up with Dr Gaines within 1-2 weeks as discussed  you need to obtain thyroid US as well.  Secondary Diagnosis:	Hypertension  Goal:	low salt diet  Secondary Diagnosis:	Chest pain Principal Discharge DX:	Thyrotoxicosis without thyroid storm, unspecified thyrotoxicosis type  Goal:	cont medications as prescribed  Instructions for follow-up, activity and diet:	you need to follow up with Dr Gaines within 1-2 weeks as discussed  you need to obtain thyroid US as well.  Secondary Diagnosis:	Hypertension  Goal:	low salt diet  Instructions for follow-up, activity and diet:	Take medications as instructed on discharge  Follow up with your PCP within 1 week  Secondary Diagnosis:	Chest pain  Instructions for follow-up, activity and diet:	Take medications as instructed on discharge  Follow up with your PCP within 1 week

## 2017-09-17 NOTE — PROGRESS NOTE ADULT - SUBJECTIVE AND OBJECTIVE BOX
Patient is a 42y old  Male who presents with a chief complaint of mid sternal chest pressure (16 Sep 2017 08:41)      SUBJECTIVE / OVERNIGHT EVENTS: Pt HR improved, Chest pain resolved.     MEDICATIONS  (STANDING):  atorvastatin 20 milliGRAM(s) Oral at bedtime  aspirin enteric coated 81 milliGRAM(s) Oral daily  metoprolol 25 milliGRAM(s) Oral two times a day  methimazole 20 milliGRAM(s) Oral two times a day  influenza   Vaccine 0.5 milliLiter(s) IntraMuscular once    MEDICATIONS  (PRN):      Vital Signs Last 24 Hrs  T(C): 36.7 (17 Sep 2017 10:10), Max: 36.8 (16 Sep 2017 19:32)  T(F): 98 (17 Sep 2017 10:10), Max: 98.2 (16 Sep 2017 19:32)  HR: 87 (17 Sep 2017 10:10) (87 - 114)  BP: 124/72 (17 Sep 2017 10:10) (110/69 - 137/80)  BP(mean): --  RR: 20 (17 Sep 2017 10:10) (14 - 20)  SpO2: 97% (17 Sep 2017 10:10) (97% - 100%)  CAPILLARY BLOOD GLUCOSE        I&O's Summary    17 Sep 2017 07:01  -  17 Sep 2017 14:36  --------------------------------------------------------  IN: 240 mL / OUT: 0 mL / NET: 240 mL        PHYSICAL EXAM:  GENERAL: NAD, well-developed  HEAD:  Atraumatic, Normocephalic  EYES: EOMI, PERRLA, conjunctiva and sclera clear  NECK: Supple, No JVD  CHEST/LUNG: Clear to auscultation bilaterally; No wheeze  HEART: Regular rate and rhythm; No murmurs, rubs, or gallops  ABDOMEN: Soft, Nontender, Nondistended; Bowel sounds present  EXTREMITIES:  2+ Peripheral Pulses, No clubbing, cyanosis, or edema  PSYCH: AAOx3  NEUROLOGY: non-focal  SKIN: No rashes or lesions    LABS:                        12.9   5.45  )-----------( 222      ( 16 Sep 2017 07:22 )             39.2     09-16    139  |  101  |  13  ----------------------------<  99  4.7   |  29  |  0.48<L>    Ca    9.4      16 Sep 2017 00:54    TPro  6.7  /  Alb  3.7  /  TBili  0.7  /  DBili  x   /  AST  31  /  ALT  61<H>  /  AlkPhos  102  09-16    PT/INR - ( 16 Sep 2017 00:54 )   PT: 11.8 SEC;   INR: 1.05          PTT - ( 16 Sep 2017 00:54 )  PTT:35.5 SEC  CARDIAC MARKERS ( 17 Sep 2017 06:00 )  x     / < 0.06 ng/mL / x     / x     / x      CARDIAC MARKERS ( 16 Sep 2017 17:45 )  x     / < 0.06 ng/mL / 53 u/L / x     / x      CARDIAC MARKERS ( 16 Sep 2017 07:00 )  x     / < 0.06 ng/mL / 58 u/L / 1.39 ng/mL / x      CARDIAC MARKERS ( 16 Sep 2017 00:54 )  x     / < 0.06 ng/mL / 91 u/L / 1.19 ng/mL / x              RADIOLOGY & ADDITIONAL TESTS:    Imaging Personally Reviewed:  < from: Transthoracic Echocardiogram (09.16.17 @ 07:41) >  1. Normal left ventricular internal dimensions and wall  thicknesses.  2. Normal left ventricular systolic function. No segmental  wall motion abnormalities.  3. Normal right ventricular size and function.    < end of copied text >      Consultant(s) Notes Reviewed:      Care Discussed with Consultants/Other Providers:
Chief Complaint: Hyperthyroidism    History: Doing much better. HR <100. Anxiety and heat intolerance has improved. Flushed appearance has resolved.    MEDICATIONS  (STANDING):  atorvastatin 20 milliGRAM(s) Oral at bedtime  aspirin enteric coated 81 milliGRAM(s) Oral daily  metoprolol 25 milliGRAM(s) Oral two times a day  methimazole 20 milliGRAM(s) Oral two times a day  influenza   Vaccine 0.5 milliLiter(s) IntraMuscular once      Review of Systems:  Constitutional: No fever  Neuro: No tremors  Cardiovascular: No  palpitations  Respiratory: No SOB, no cough  GI: No nausea, vomiting, abdominal pain  Skin: no rash  Endocrine: no polyuria, polydipsia    ALL OTHER SYSTEMS REVIEWED AND NEGATIVE    PHYSICAL EXAM:  VITALS: T(C): 36.8 (09-17-17 @ 14:38)  T(F): 98.3 (09-17-17 @ 14:38), Max: 98.3 (09-17-17 @ 14:38)  HR: 90 (09-17-17 @ 14:38) (87 - 114)  BP: 135/77 (09-17-17 @ 14:38) (110/69 - 137/80)  RR:  (14 - 20)  SpO2:  (97% - 100%)  GENERAL: NAD, well-groomed, well-developed, thin male  EYES: No proptosis, no lid lag, anicteric  HEENT:  Atraumatic, Normocephalic, moist mucous membranes  THYROID: Normal size, no palpable nodules  RESPIRATORY: Clear to auscultation bilaterally; No rales, rhonchi, wheezing, or rubs  CARDIOVASCULAR: Regular rate and rhythm; No murmurs; no peripheral edema  GI: Soft, nontender, non distended, normal bowel sounds  SKIN: Dry, intact, No rashes or lesions. No flushing  PSYCH: Alert and oriented x 3, normal affect, normal mood      09-16    139  |  101  |  13  ----------------------------<  99  4.7   |  29  |  0.48<L>    EGFR if : 160  EGFR if non : 138    Ca    9.4      09-16    TPro  6.7  /  Alb  3.7  /  TBili  0.7  /  DBili  x   /  AST  31  /  ALT  61<H>  /  AlkPhos  102  09-16      Thyroid Function Tests:  09-16 @ 07:00 TSH -- FreeT4 7.57 T3 583.0 Anti TPO -- Anti Thyroglobulin Ab -- TSI --  09-16 @ 00:54 TSH 0.01 FreeT4 -- T3 -- Anti TPO -- Anti Thyroglobulin Ab -- TSI --

## 2017-09-17 NOTE — DISCHARGE NOTE ADULT - CARE PROVIDER_API CALL
Maria Fernanda Gaines (DO), Internal Medicine  [address] [place] [address]  Phone: [phone]  Fax: [phone]

## 2017-09-17 NOTE — PROGRESS NOTE ADULT - ASSESSMENT
43 yo M PMH Htn, CAD, hyperthyroidism p/w mid sternal chest pain.
43 y/o male with acute thyrotoxicosis, tachycardia, HTN, HLD, and history of CAD s/p 1 stent here with c/o chest pain, palpitations, and diaphoresis.

## 2017-09-17 NOTE — PROGRESS NOTE ADULT - PROBLEM SELECTOR PLAN 1
Tele montoring, CE negative, Echo normal LVEF  f/u cardiology.   Cont ASA and metoprolol, lipitor
- Acute thyrotoxicosis has resolved  - Hyperthyroidism is likely due to Graves Disease and suspicion for thyroid nodules is low given no nodules or goiter on exam.  - Thyroid ultrasound can be done outpatient, please provide requisition, results to Dr. Maria Fernanda Gaines DO ([address] [place] [id number])  - Recommend continue Methimazole 20 mg BID  - Recommend continue Metoprolol 25 mg BID    Ok to discharge home from an endocrine standpoint. For f/u appoint in 4-6 weeks, patient will call [facility] at [phone] tomorrow.    Please provide patient with a RX to excuse him from flying out of state at this time. He must stay local for medical management. Thank you.    D/w attending    Maria Fernanda Gaines DO  Endocrine Fellow   Pager: [phone].

## 2017-09-17 NOTE — DISCHARGE NOTE ADULT - PATIENT PORTAL LINK FT
“You can access the FollowHealth Patient Portal, offered by [facility], by registering with the following website: [email or web address]”

## 2017-09-19 LAB — TSH RECEP AB FLD-ACNC: 7.17 IU/L — HIGH (ref 0–1.75)

## 2017-09-20 ENCOUNTER — OUTPATIENT (OUTPATIENT)
Dept: OUTPATIENT SERVICES | Facility: HOSPITAL | Age: 42
LOS: 1 days | End: 2017-09-20
Payer: SELF-PAY

## 2017-09-20 ENCOUNTER — APPOINTMENT (OUTPATIENT)
Dept: ULTRASOUND IMAGING | Facility: HOSPITAL | Age: 42
End: 2017-09-20
Payer: COMMERCIAL

## 2017-09-20 ENCOUNTER — APPOINTMENT (OUTPATIENT)
Dept: CARDIOLOGY | Facility: CLINIC | Age: 42
End: 2017-09-20

## 2017-09-20 DIAGNOSIS — E05.90 THYROTOXICOSIS, UNSPECIFIED WITHOUT THYROTOXIC CRISIS OR STORM: ICD-10-CM

## 2017-09-20 PROCEDURE — 76536 US EXAM OF HEAD AND NECK: CPT

## 2017-09-20 PROCEDURE — 76536 US EXAM OF HEAD AND NECK: CPT | Mod: 26

## 2017-09-21 LAB — TSI ACT/NOR SER: 365 % — HIGH (ref 0–139)

## 2017-09-21 RX ORDER — METOPROLOL TARTRATE 50 MG
0 TABLET ORAL
Qty: 0 | Refills: 0 | COMMUNITY

## 2017-09-21 RX ORDER — ATORVASTATIN CALCIUM 80 MG/1
0 TABLET, FILM COATED ORAL
Qty: 0 | Refills: 0 | COMMUNITY

## 2017-09-21 RX ORDER — ASPIRIN/CALCIUM CARB/MAGNESIUM 324 MG
0 TABLET ORAL
Qty: 0 | Refills: 0 | COMMUNITY

## 2017-10-02 ENCOUNTER — OTHER (OUTPATIENT)
Age: 42
End: 2017-10-02

## 2017-10-12 ENCOUNTER — OUTPATIENT (OUTPATIENT)
Dept: OUTPATIENT SERVICES | Facility: HOSPITAL | Age: 42
LOS: 1 days | End: 2017-10-12
Payer: SELF-PAY

## 2017-10-12 ENCOUNTER — APPOINTMENT (OUTPATIENT)
Dept: ENDOCRINOLOGY | Facility: HOSPITAL | Age: 42
End: 2017-10-12

## 2017-10-12 VITALS
BODY MASS INDEX: 20.4 KG/M2 | HEART RATE: 54 BPM | DIASTOLIC BLOOD PRESSURE: 69 MMHG | SYSTOLIC BLOOD PRESSURE: 115 MMHG | WEIGHT: 130 LBS | HEIGHT: 67 IN

## 2017-10-12 DIAGNOSIS — E06.9 THYROIDITIS, UNSPECIFIED: ICD-10-CM

## 2017-10-12 DIAGNOSIS — R74.0 NONSPECIFIC ELEVATION OF LEVELS OF TRANSAMINASE AND LACTIC ACID DEHYDROGENASE [LDH]: ICD-10-CM

## 2017-10-12 DIAGNOSIS — E05.90 THYROTOXICOSIS, UNSPECIFIED WITHOUT THYROTOXIC CRISIS OR STORM: ICD-10-CM

## 2017-10-12 PROBLEM — I25.10 ATHEROSCLEROTIC HEART DISEASE OF NATIVE CORONARY ARTERY WITHOUT ANGINA PECTORIS: Chronic | Status: ACTIVE | Noted: 2017-09-16

## 2017-10-12 LAB
ALBUMIN SERPL ELPH-MCNC: 4.1 G/DL — SIGNIFICANT CHANGE UP (ref 3.3–5)
ALP SERPL-CCNC: 134 U/L — HIGH (ref 40–120)
ALT FLD-CCNC: 106 U/L — HIGH (ref 10–45)
ANION GAP SERPL CALC-SCNC: 12 MMOL/L — SIGNIFICANT CHANGE UP (ref 5–17)
AST SERPL-CCNC: 47 U/L — HIGH (ref 10–40)
BASOPHILS # BLD AUTO: 0.02 K/UL — SIGNIFICANT CHANGE UP (ref 0–0.2)
BASOPHILS NFR BLD AUTO: 0.3 % — SIGNIFICANT CHANGE UP (ref 0–2)
BILIRUB SERPL-MCNC: 0.8 MG/DL — SIGNIFICANT CHANGE UP (ref 0.2–1.2)
BUN SERPL-MCNC: 9 MG/DL — SIGNIFICANT CHANGE UP (ref 7–23)
CALCIUM SERPL-MCNC: 9.6 MG/DL — SIGNIFICANT CHANGE UP (ref 8.4–10.5)
CHLORIDE SERPL-SCNC: 100 MMOL/L — SIGNIFICANT CHANGE UP (ref 96–108)
CO2 SERPL-SCNC: 28 MMOL/L — SIGNIFICANT CHANGE UP (ref 22–31)
CREAT SERPL-MCNC: 0.61 MG/DL — SIGNIFICANT CHANGE UP (ref 0.5–1.3)
EOSINOPHIL # BLD AUTO: 0.64 K/UL — HIGH (ref 0–0.5)
EOSINOPHIL NFR BLD AUTO: 8.9 % — HIGH (ref 0–6)
GLUCOSE SERPL-MCNC: 89 MG/DL — SIGNIFICANT CHANGE UP (ref 70–99)
HCT VFR BLD CALC: 43.8 % — SIGNIFICANT CHANGE UP (ref 39–50)
HGB BLD-MCNC: 14.8 G/DL — SIGNIFICANT CHANGE UP (ref 13–17)
IMM GRANULOCYTES NFR BLD AUTO: 0.1 % — SIGNIFICANT CHANGE UP (ref 0–1.5)
LYMPHOCYTES # BLD AUTO: 2.53 K/UL — SIGNIFICANT CHANGE UP (ref 1–3.3)
LYMPHOCYTES # BLD AUTO: 35.3 % — SIGNIFICANT CHANGE UP (ref 13–44)
MCHC RBC-ENTMCNC: 28.2 PG — SIGNIFICANT CHANGE UP (ref 27–34)
MCHC RBC-ENTMCNC: 33.8 GM/DL — SIGNIFICANT CHANGE UP (ref 32–36)
MCV RBC AUTO: 83.4 FL — SIGNIFICANT CHANGE UP (ref 80–100)
MONOCYTES # BLD AUTO: 0.73 K/UL — SIGNIFICANT CHANGE UP (ref 0–0.9)
MONOCYTES NFR BLD AUTO: 10.2 % — SIGNIFICANT CHANGE UP (ref 2–14)
NEUTROPHILS # BLD AUTO: 3.23 K/UL — SIGNIFICANT CHANGE UP (ref 1.8–7.4)
NEUTROPHILS NFR BLD AUTO: 45.2 % — SIGNIFICANT CHANGE UP (ref 43–77)
PLATELET # BLD AUTO: 299 K/UL — SIGNIFICANT CHANGE UP (ref 150–400)
POTASSIUM SERPL-MCNC: 4.9 MMOL/L — SIGNIFICANT CHANGE UP (ref 3.5–5.3)
POTASSIUM SERPL-SCNC: 4.9 MMOL/L — SIGNIFICANT CHANGE UP (ref 3.5–5.3)
PROT SERPL-MCNC: 7.9 G/DL — SIGNIFICANT CHANGE UP (ref 6–8.3)
RBC # BLD: 5.25 M/UL — SIGNIFICANT CHANGE UP (ref 4.2–5.8)
RBC # FLD: 14 % — SIGNIFICANT CHANGE UP (ref 10.3–14.5)
SODIUM SERPL-SCNC: 140 MMOL/L — SIGNIFICANT CHANGE UP (ref 135–145)
WBC # BLD: 7.16 K/UL — SIGNIFICANT CHANGE UP (ref 3.8–10.5)
WBC # FLD AUTO: 7.16 K/UL — SIGNIFICANT CHANGE UP (ref 3.8–10.5)

## 2017-10-12 PROCEDURE — 85027 COMPLETE CBC AUTOMATED: CPT

## 2017-10-12 PROCEDURE — G0463: CPT

## 2017-10-12 PROCEDURE — 84443 ASSAY THYROID STIM HORMONE: CPT

## 2017-10-12 PROCEDURE — 36415 COLL VENOUS BLD VENIPUNCTURE: CPT

## 2017-10-12 PROCEDURE — 84480 ASSAY TRIIODOTHYRONINE (T3): CPT

## 2017-10-12 PROCEDURE — 84439 ASSAY OF FREE THYROXINE: CPT

## 2017-10-12 PROCEDURE — 80053 COMPREHEN METABOLIC PANEL: CPT

## 2017-10-12 PROCEDURE — 83520 IMMUNOASSAY QUANT NOS NONAB: CPT

## 2017-10-13 PROBLEM — R74.0 TRANSAMINITIS: Status: ACTIVE | Noted: 2017-10-13

## 2017-10-13 LAB
T3 SERPL-MCNC: 268 NG/DL — HIGH (ref 80–200)
T4 FREE SERPL-MCNC: 1.9 NG/DL — HIGH (ref 0.9–1.8)
TSH RECEP AB FLD-ACNC: 6.38 IU/L — HIGH (ref 0–1.75)
TSH SERPL-MCNC: <0.01 UIU/ML — LOW (ref 0.27–4.2)

## 2017-10-17 ENCOUNTER — APPOINTMENT (OUTPATIENT)
Dept: NUCLEAR MEDICINE | Facility: HOSPITAL | Age: 42
End: 2017-10-17

## 2017-10-18 ENCOUNTER — APPOINTMENT (OUTPATIENT)
Dept: NUCLEAR MEDICINE | Facility: HOSPITAL | Age: 42
End: 2017-10-18

## 2017-10-23 ENCOUNTER — APPOINTMENT (OUTPATIENT)
Dept: NUCLEAR MEDICINE | Facility: HOSPITAL | Age: 42
End: 2017-10-23
Payer: COMMERCIAL

## 2017-10-23 ENCOUNTER — OUTPATIENT (OUTPATIENT)
Dept: OUTPATIENT SERVICES | Facility: HOSPITAL | Age: 42
LOS: 1 days | End: 2017-10-23
Payer: SELF-PAY

## 2017-10-23 DIAGNOSIS — E05.90 THYROTOXICOSIS, UNSPECIFIED WITHOUT THYROTOXIC CRISIS OR STORM: ICD-10-CM

## 2017-10-23 DIAGNOSIS — E06.9 THYROIDITIS, UNSPECIFIED: ICD-10-CM

## 2017-10-24 ENCOUNTER — APPOINTMENT (OUTPATIENT)
Dept: NUCLEAR MEDICINE | Facility: HOSPITAL | Age: 42
End: 2017-10-24

## 2017-10-24 PROCEDURE — 78014 THYROID IMAGING W/BLOOD FLOW: CPT | Mod: 26

## 2017-10-26 ENCOUNTER — APPOINTMENT (OUTPATIENT)
Dept: NUCLEAR MEDICINE | Facility: HOSPITAL | Age: 42
End: 2017-10-26

## 2017-10-26 PROCEDURE — 79005 NUCLEAR RX ORAL ADMIN: CPT | Mod: 26

## 2017-10-26 PROCEDURE — 78014 THYROID IMAGING W/BLOOD FLOW: CPT

## 2017-10-26 PROCEDURE — 79005 NUCLEAR RX ORAL ADMIN: CPT

## 2017-10-26 PROCEDURE — A9517: CPT

## 2017-10-26 PROCEDURE — A9516: CPT

## 2017-11-01 ENCOUNTER — APPOINTMENT (OUTPATIENT)
Dept: CARDIOLOGY | Facility: CLINIC | Age: 42
End: 2017-11-01
Payer: COMMERCIAL

## 2017-11-01 ENCOUNTER — LABORATORY RESULT (OUTPATIENT)
Age: 42
End: 2017-11-01

## 2017-11-01 ENCOUNTER — OUTPATIENT (OUTPATIENT)
Dept: OUTPATIENT SERVICES | Facility: HOSPITAL | Age: 42
LOS: 1 days | End: 2017-11-01
Payer: SELF-PAY

## 2017-11-01 VITALS
HEART RATE: 72 BPM | RESPIRATION RATE: 16 BRPM | DIASTOLIC BLOOD PRESSURE: 76 MMHG | BODY MASS INDEX: 21.4 KG/M2 | HEIGHT: 67 IN | SYSTOLIC BLOOD PRESSURE: 115 MMHG | WEIGHT: 136.38 LBS | OXYGEN SATURATION: 97 %

## 2017-11-01 DIAGNOSIS — Z00.8 ENCOUNTER FOR OTHER GENERAL EXAMINATION: ICD-10-CM

## 2017-11-01 PROCEDURE — ZZZZZ: CPT

## 2017-11-01 PROCEDURE — 93005 ELECTROCARDIOGRAM TRACING: CPT

## 2017-11-01 PROCEDURE — G0463: CPT | Mod: 25

## 2017-11-06 ENCOUNTER — RX RENEWAL (OUTPATIENT)
Age: 42
End: 2017-11-06

## 2017-11-08 ENCOUNTER — OUTPATIENT (OUTPATIENT)
Dept: OUTPATIENT SERVICES | Facility: HOSPITAL | Age: 42
LOS: 1 days | End: 2017-11-08
Payer: SELF-PAY

## 2017-11-08 DIAGNOSIS — E06.9 THYROIDITIS, UNSPECIFIED: ICD-10-CM

## 2017-11-08 DIAGNOSIS — E05.90 THYROTOXICOSIS, UNSPECIFIED WITHOUT THYROTOXIC CRISIS OR STORM: ICD-10-CM

## 2017-11-08 LAB
T4 FREE SERPL-MCNC: 2.1 NG/DL — HIGH (ref 0.9–1.8)
TSH SERPL-MCNC: <0.01 UIU/ML — LOW (ref 0.27–4.2)

## 2017-11-08 PROCEDURE — 84439 ASSAY OF FREE THYROXINE: CPT

## 2017-11-08 PROCEDURE — 36415 COLL VENOUS BLD VENIPUNCTURE: CPT

## 2017-11-08 PROCEDURE — 84443 ASSAY THYROID STIM HORMONE: CPT

## 2017-11-10 DIAGNOSIS — I10 ESSENTIAL (PRIMARY) HYPERTENSION: ICD-10-CM

## 2017-11-10 DIAGNOSIS — E05.90 THYROTOXICOSIS, UNSPECIFIED WITHOUT THYROTOXIC CRISIS OR STORM: ICD-10-CM

## 2017-11-10 DIAGNOSIS — E78.5 HYPERLIPIDEMIA, UNSPECIFIED: ICD-10-CM

## 2017-11-10 DIAGNOSIS — I25.10 ATHEROSCLEROTIC HEART DISEASE OF NATIVE CORONARY ARTERY WITHOUT ANGINA PECTORIS: ICD-10-CM

## 2017-11-11 ENCOUNTER — TRANSCRIPTION ENCOUNTER (OUTPATIENT)
Age: 42
End: 2017-11-11

## 2017-11-16 ENCOUNTER — RX RENEWAL (OUTPATIENT)
Age: 42
End: 2017-11-16

## 2017-11-24 ENCOUNTER — RX RENEWAL (OUTPATIENT)
Age: 42
End: 2017-11-24

## 2017-11-27 ENCOUNTER — RX RENEWAL (OUTPATIENT)
Age: 42
End: 2017-11-27

## 2017-12-07 ENCOUNTER — OUTPATIENT (OUTPATIENT)
Dept: OUTPATIENT SERVICES | Facility: HOSPITAL | Age: 42
LOS: 1 days | End: 2017-12-07
Payer: SELF-PAY

## 2017-12-07 DIAGNOSIS — E05.90 THYROTOXICOSIS, UNSPECIFIED WITHOUT THYROTOXIC CRISIS OR STORM: ICD-10-CM

## 2017-12-07 DIAGNOSIS — E06.9 THYROIDITIS, UNSPECIFIED: ICD-10-CM

## 2017-12-07 LAB — TSH SERPL-MCNC: 34.71 UIU/ML — HIGH (ref 0.27–4.2)

## 2017-12-07 PROCEDURE — 84443 ASSAY THYROID STIM HORMONE: CPT

## 2017-12-14 ENCOUNTER — RX RENEWAL (OUTPATIENT)
Age: 42
End: 2017-12-14

## 2017-12-21 ENCOUNTER — APPOINTMENT (OUTPATIENT)
Dept: INTERNAL MEDICINE | Facility: CLINIC | Age: 42
End: 2017-12-21

## 2018-01-10 ENCOUNTER — RX RENEWAL (OUTPATIENT)
Age: 43
End: 2018-01-10

## 2018-01-17 ENCOUNTER — MEDICATION RENEWAL (OUTPATIENT)
Age: 43
End: 2018-01-17

## 2018-01-25 ENCOUNTER — APPOINTMENT (OUTPATIENT)
Dept: ENDOCRINOLOGY | Facility: HOSPITAL | Age: 43
End: 2018-01-25

## 2018-02-16 ENCOUNTER — APPOINTMENT (OUTPATIENT)
Dept: ENDOCRINOLOGY | Facility: CLINIC | Age: 43
End: 2018-02-16
Payer: COMMERCIAL

## 2018-02-16 VITALS
SYSTOLIC BLOOD PRESSURE: 131 MMHG | DIASTOLIC BLOOD PRESSURE: 75 MMHG | BODY MASS INDEX: 23.54 KG/M2 | HEIGHT: 67 IN | WEIGHT: 150 LBS | HEART RATE: 58 BPM | OXYGEN SATURATION: 98 % | TEMPERATURE: 98.6 F

## 2018-02-16 DIAGNOSIS — Z83.49 FAMILY HISTORY OF OTHER ENDOCRINE, NUTRITIONAL AND METABOLIC DISEASES: ICD-10-CM

## 2018-02-16 DIAGNOSIS — Z82.49 FAMILY HISTORY OF ISCHEMIC HEART DISEASE AND OTHER DISEASES OF THE CIRCULATORY SYSTEM: ICD-10-CM

## 2018-02-16 DIAGNOSIS — Z95.5 PRESENCE OF CORONARY ANGIOPLASTY IMPLANT AND GRAFT: ICD-10-CM

## 2018-02-16 PROCEDURE — 99215 OFFICE O/P EST HI 40 MIN: CPT

## 2018-02-18 ENCOUNTER — OTHER (OUTPATIENT)
Age: 43
End: 2018-02-18

## 2018-02-18 DIAGNOSIS — E04.2 NONTOXIC MULTINODULAR GOITER: ICD-10-CM

## 2018-02-18 PROBLEM — Z83.49 FAMILY HISTORY OF HYPERTHYROIDISM: Status: ACTIVE | Noted: 2018-02-18

## 2018-02-18 PROBLEM — Z95.5 HISTORY OF CORONARY ARTERY STENT PLACEMENT: Status: RESOLVED | Noted: 2018-02-18 | Resolved: 2018-02-18

## 2018-02-18 RX ORDER — PNV NO.95/FERROUS FUM/FOLIC AC 28MG-0.8MG
TABLET ORAL DAILY
Refills: 0 | Status: ACTIVE | COMMUNITY
Start: 2018-02-18

## 2018-02-18 RX ORDER — METHIMAZOLE 10 MG/1
10 TABLET ORAL DAILY
Qty: 60 | Refills: 2 | Status: DISCONTINUED | COMMUNITY
Start: 2017-10-13 | End: 2018-02-18

## 2018-02-20 ENCOUNTER — APPOINTMENT (OUTPATIENT)
Dept: OPHTHALMOLOGY | Facility: CLINIC | Age: 43
End: 2018-02-20
Payer: COMMERCIAL

## 2018-02-20 PROCEDURE — 92083 EXTENDED VISUAL FIELD XM: CPT

## 2018-02-20 PROCEDURE — 99204 OFFICE O/P NEW MOD 45 MIN: CPT

## 2018-02-20 PROCEDURE — 92133 CPTRZD OPH DX IMG PST SGM ON: CPT

## 2018-03-01 LAB
25(OH)D3 SERPL-MCNC: 17.3 NG/ML
ALBUMIN SERPL ELPH-MCNC: 4.3 G/DL
ALP BLD-CCNC: 114 U/L
ALT SERPL-CCNC: 32 U/L
ANION GAP SERPL CALC-SCNC: 13 MMOL/L
AST SERPL-CCNC: 21 U/L
BASOPHILS # BLD AUTO: 0.01 K/UL
BASOPHILS NFR BLD AUTO: 0.1 %
BILIRUB SERPL-MCNC: 1.2 MG/DL
BUN SERPL-MCNC: 11 MG/DL
CALCIUM SERPL-MCNC: 9.5 MG/DL
CHLORIDE SERPL-SCNC: 105 MMOL/L
CO2 SERPL-SCNC: 26 MMOL/L
CREAT SERPL-MCNC: 0.84 MG/DL
EOSINOPHIL # BLD AUTO: 0.27 K/UL
EOSINOPHIL NFR BLD AUTO: 3.9 %
GLUCOSE SERPL-MCNC: 94 MG/DL
HBA1C MFR BLD HPLC: 5.3 %
HCT VFR BLD CALC: 44.5 %
HGB BLD-MCNC: 15.7 G/DL
IMM GRANULOCYTES NFR BLD AUTO: 0.3 %
LYMPHOCYTES # BLD AUTO: 1.62 K/UL
LYMPHOCYTES NFR BLD AUTO: 23.3 %
MAN DIFF?: NORMAL
MCHC RBC-ENTMCNC: 31.7 PG
MCHC RBC-ENTMCNC: 35.3 GM/DL
MCV RBC AUTO: 89.9 FL
MONOCYTES # BLD AUTO: 0.64 K/UL
MONOCYTES NFR BLD AUTO: 9.2 %
NEUTROPHILS # BLD AUTO: 4.39 K/UL
NEUTROPHILS NFR BLD AUTO: 63.2 %
PLATELET # BLD AUTO: 268 K/UL
POTASSIUM SERPL-SCNC: 4.3 MMOL/L
PROT SERPL-MCNC: 7.5 G/DL
RBC # BLD: 4.95 M/UL
RBC # FLD: 12 %
SODIUM SERPL-SCNC: 144 MMOL/L
T3 SERPL-MCNC: 214 NG/DL
T4 FREE SERPL-MCNC: 2.7 NG/DL
TSH RECEPTOR AB: 17.95 IU/L
TSH SERPL-ACNC: <0.01 UIU/ML
TSI ACT/NOR SER: 15 IU/L
WBC # FLD AUTO: 6.95 K/UL

## 2018-04-03 ENCOUNTER — APPOINTMENT (OUTPATIENT)
Dept: ENDOCRINOLOGY | Facility: CLINIC | Age: 43
End: 2018-04-03
Payer: COMMERCIAL

## 2018-04-03 ENCOUNTER — LABORATORY RESULT (OUTPATIENT)
Age: 43
End: 2018-04-03

## 2018-04-03 VITALS
OXYGEN SATURATION: 98 % | SYSTOLIC BLOOD PRESSURE: 110 MMHG | WEIGHT: 149 LBS | HEIGHT: 67 IN | BODY MASS INDEX: 23.39 KG/M2 | HEART RATE: 61 BPM | DIASTOLIC BLOOD PRESSURE: 70 MMHG

## 2018-04-03 PROCEDURE — 99205 OFFICE O/P NEW HI 60 MIN: CPT

## 2018-04-03 RX ORDER — LEVOTHYROXINE SODIUM 0.07 MG/1
75 TABLET ORAL DAILY
Qty: 30 | Refills: 4 | Status: DISCONTINUED | COMMUNITY
Start: 2017-12-14 | End: 2018-04-03

## 2018-04-04 LAB
25(OH)D3 SERPL-MCNC: 22.2 NG/ML
HBA1C MFR BLD HPLC: 5.4 %
T3 SERPL-MCNC: 157 NG/DL
T4 FREE SERPL-MCNC: 1.4 NG/DL
TSH SERPL-ACNC: <0.01 UIU/ML
TTG IGA SER IA-ACNC: <5 UNITS
TTG IGA SER-ACNC: NEGATIVE
TTG IGG SER IA-ACNC: <5 UNITS
TTG IGG SER IA-ACNC: NEGATIVE

## 2018-04-18 ENCOUNTER — RX RENEWAL (OUTPATIENT)
Age: 43
End: 2018-04-18

## 2018-05-30 ENCOUNTER — APPOINTMENT (OUTPATIENT)
Dept: CARDIOLOGY | Facility: CLINIC | Age: 43
End: 2018-05-30
Payer: COMMERCIAL

## 2018-05-30 ENCOUNTER — OUTPATIENT (OUTPATIENT)
Dept: OUTPATIENT SERVICES | Facility: HOSPITAL | Age: 43
LOS: 1 days | End: 2018-05-30
Payer: COMMERCIAL

## 2018-05-30 VITALS
OXYGEN SATURATION: 97 % | WEIGHT: 149 LBS | DIASTOLIC BLOOD PRESSURE: 76 MMHG | HEART RATE: 57 BPM | SYSTOLIC BLOOD PRESSURE: 113 MMHG | BODY MASS INDEX: 23.39 KG/M2 | HEIGHT: 67 IN | RESPIRATION RATE: 16 BRPM

## 2018-05-30 DIAGNOSIS — Z00.8 ENCOUNTER FOR OTHER GENERAL EXAMINATION: ICD-10-CM

## 2018-05-30 PROCEDURE — 93005 ELECTROCARDIOGRAM TRACING: CPT

## 2018-05-30 PROCEDURE — 93010 ELECTROCARDIOGRAM REPORT: CPT

## 2018-05-30 PROCEDURE — G0463: CPT | Mod: 25

## 2018-05-30 PROCEDURE — ZZZZZ: CPT

## 2018-05-31 ENCOUNTER — APPOINTMENT (OUTPATIENT)
Dept: ENDOCRINOLOGY | Facility: CLINIC | Age: 43
End: 2018-05-31
Payer: COMMERCIAL

## 2018-05-31 VITALS
HEART RATE: 58 BPM | OXYGEN SATURATION: 98 % | HEIGHT: 67 IN | SYSTOLIC BLOOD PRESSURE: 120 MMHG | BODY MASS INDEX: 23.39 KG/M2 | DIASTOLIC BLOOD PRESSURE: 80 MMHG | WEIGHT: 149 LBS

## 2018-05-31 PROCEDURE — 99214 OFFICE O/P EST MOD 30 MIN: CPT

## 2018-06-01 LAB
25(OH)D3 SERPL-MCNC: 30.1 NG/ML
T3 SERPL-MCNC: 126 NG/DL
T4 FREE SERPL-MCNC: 1.2 NG/DL
TSH SERPL-ACNC: 0.07 UIU/ML

## 2018-06-06 ENCOUNTER — APPOINTMENT (OUTPATIENT)
Dept: OPHTHALMOLOGY | Facility: CLINIC | Age: 43
End: 2018-06-06
Payer: COMMERCIAL

## 2018-06-06 DIAGNOSIS — E78.5 HYPERLIPIDEMIA, UNSPECIFIED: ICD-10-CM

## 2018-06-06 DIAGNOSIS — I25.10 ATHEROSCLEROTIC HEART DISEASE OF NATIVE CORONARY ARTERY WITHOUT ANGINA PECTORIS: ICD-10-CM

## 2018-06-06 DIAGNOSIS — I10 ESSENTIAL (PRIMARY) HYPERTENSION: ICD-10-CM

## 2018-06-06 DIAGNOSIS — E05.00 THYROTOXICOSIS WITH DIFFUSE GOITER WITHOUT THYROTOXIC CRISIS OR STORM: ICD-10-CM

## 2018-06-06 PROCEDURE — 92083 EXTENDED VISUAL FIELD XM: CPT

## 2018-06-06 PROCEDURE — 92133 CPTRZD OPH DX IMG PST SGM ON: CPT

## 2018-06-06 PROCEDURE — 92014 COMPRE OPH EXAM EST PT 1/>: CPT

## 2018-07-03 ENCOUNTER — OUTPATIENT (OUTPATIENT)
Dept: OUTPATIENT SERVICES | Facility: HOSPITAL | Age: 43
LOS: 1 days | End: 2018-07-03
Payer: COMMERCIAL

## 2018-07-03 ENCOUNTER — APPOINTMENT (OUTPATIENT)
Dept: CARDIOLOGY | Facility: CLINIC | Age: 43
End: 2018-07-03

## 2018-07-03 DIAGNOSIS — Z00.8 ENCOUNTER FOR OTHER GENERAL EXAMINATION: ICD-10-CM

## 2018-07-03 PROCEDURE — 93306 TTE W/DOPPLER COMPLETE: CPT

## 2018-07-03 PROCEDURE — 93306 TTE W/DOPPLER COMPLETE: CPT | Mod: 26

## 2018-08-27 ENCOUNTER — APPOINTMENT (OUTPATIENT)
Dept: ENDOCRINOLOGY | Facility: CLINIC | Age: 43
End: 2018-08-27
Payer: COMMERCIAL

## 2018-08-27 VITALS
DIASTOLIC BLOOD PRESSURE: 70 MMHG | HEART RATE: 79 BPM | HEIGHT: 67 IN | WEIGHT: 154 LBS | SYSTOLIC BLOOD PRESSURE: 120 MMHG | BODY MASS INDEX: 24.17 KG/M2 | OXYGEN SATURATION: 98 %

## 2018-08-27 PROCEDURE — 99214 OFFICE O/P EST MOD 30 MIN: CPT

## 2018-08-28 LAB
25(OH)D3 SERPL-MCNC: 20.2 NG/ML
T3 SERPL-MCNC: 128 NG/DL
T4 FREE SERPL-MCNC: 1.2 NG/DL
THYROGLOB AB SERPL-ACNC: 68 IU/ML
THYROPEROXIDASE AB SERPL IA-ACNC: 165 IU/ML
TSH SERPL-ACNC: 8.15 UIU/ML

## 2018-08-28 RX ORDER — MULTIVIT-MIN/FOLIC/VIT K/LYCOP 400-300MCG
50 MCG TABLET ORAL
Qty: 1 | Refills: 0 | Status: ACTIVE | COMMUNITY
Start: 2018-08-28 | End: 1900-01-01

## 2018-11-09 ENCOUNTER — APPOINTMENT (OUTPATIENT)
Dept: OPHTHALMOLOGY | Facility: CLINIC | Age: 43
End: 2018-11-09
Payer: COMMERCIAL

## 2018-11-09 PROCEDURE — 92014 COMPRE OPH EXAM EST PT 1/>: CPT

## 2018-11-30 ENCOUNTER — RX RENEWAL (OUTPATIENT)
Age: 43
End: 2018-11-30

## 2018-12-17 ENCOUNTER — APPOINTMENT (OUTPATIENT)
Dept: ENDOCRINOLOGY | Facility: CLINIC | Age: 43
End: 2018-12-17
Payer: COMMERCIAL

## 2018-12-17 VITALS
HEIGHT: 67 IN | HEART RATE: 65 BPM | OXYGEN SATURATION: 98 % | SYSTOLIC BLOOD PRESSURE: 120 MMHG | DIASTOLIC BLOOD PRESSURE: 78 MMHG | WEIGHT: 158 LBS | BODY MASS INDEX: 24.8 KG/M2

## 2018-12-17 DIAGNOSIS — E55.9 VITAMIN D DEFICIENCY, UNSPECIFIED: ICD-10-CM

## 2018-12-17 PROCEDURE — 99213 OFFICE O/P EST LOW 20 MIN: CPT

## 2018-12-17 RX ORDER — ADHESIVE TAPE 3"X 2.3 YD
50 MCG TAPE, NON-MEDICATED TOPICAL
Qty: 90 | Refills: 3 | Status: ACTIVE | COMMUNITY
Start: 2018-12-17 | End: 1900-01-01

## 2018-12-17 RX ORDER — CHROMIUM 200 MCG
1000 TABLET ORAL DAILY
Qty: 30 | Refills: 0 | Status: DISCONTINUED | COMMUNITY
Start: 2018-04-04 | End: 2018-12-17

## 2018-12-18 ENCOUNTER — RX RENEWAL (OUTPATIENT)
Age: 43
End: 2018-12-18

## 2018-12-18 LAB
T3 SERPL-MCNC: 103 NG/DL
T4 FREE SERPL-MCNC: 1.2 NG/DL
TSH SERPL-ACNC: 7.47 UIU/ML

## 2018-12-19 ENCOUNTER — APPOINTMENT (OUTPATIENT)
Dept: CARDIOLOGY | Facility: CLINIC | Age: 43
End: 2018-12-19
Payer: COMMERCIAL

## 2018-12-19 ENCOUNTER — RESULT REVIEW (OUTPATIENT)
Age: 43
End: 2018-12-19

## 2018-12-19 ENCOUNTER — OUTPATIENT (OUTPATIENT)
Dept: OUTPATIENT SERVICES | Facility: HOSPITAL | Age: 43
LOS: 1 days | End: 2018-12-19
Payer: COMMERCIAL

## 2018-12-19 VITALS
RESPIRATION RATE: 16 BRPM | DIASTOLIC BLOOD PRESSURE: 62 MMHG | BODY MASS INDEX: 24.8 KG/M2 | OXYGEN SATURATION: 98 % | WEIGHT: 158 LBS | HEIGHT: 67 IN | HEART RATE: 61 BPM | SYSTOLIC BLOOD PRESSURE: 116 MMHG

## 2018-12-19 DIAGNOSIS — Z00.8 ENCOUNTER FOR OTHER GENERAL EXAMINATION: ICD-10-CM

## 2018-12-19 LAB
THYROGLOB AB SERPL-ACNC: 50 IU/ML
THYROPEROXIDASE AB SERPL IA-ACNC: 137 IU/ML

## 2018-12-19 PROCEDURE — ZZZZZ: CPT

## 2018-12-19 PROCEDURE — G0463: CPT | Mod: 25

## 2018-12-19 NOTE — PHYSICAL EXAM
[General Appearance - Well Developed] : well developed [Normal Appearance] : normal appearance [Well Groomed] : well groomed [General Appearance - Well Nourished] : well nourished [No Deformities] : no deformities [General Appearance - In No Acute Distress] : no acute distress [Normal Conjunctiva] : the conjunctiva exhibited no abnormalities [Eyelids - No Xanthelasma] : the eyelids demonstrated no xanthelasmas [Normal Oral Mucosa] : normal oral mucosa [No Oral Pallor] : no oral pallor [No Oral Cyanosis] : no oral cyanosis [Normal Jugular Venous A Waves Present] : normal jugular venous A waves present [Normal Jugular Venous V Waves Present] : normal jugular venous V waves present [No Jugular Venous Chaves A Waves] : no jugular venous chaves A waves [Respiration, Rhythm And Depth] : normal respiratory rhythm and effort [Exaggerated Use Of Accessory Muscles For Inspiration] : no accessory muscle use [Auscultation Breath Sounds / Voice Sounds] : lungs were clear to auscultation bilaterally [Heart Rate And Rhythm] : heart rate and rhythm were normal [Heart Sounds] : normal S1 and S2 [Murmurs] : no murmurs present [Abdomen Soft] : soft [Abdomen Tenderness] : non-tender [Abdomen Mass (___ Cm)] : no abdominal mass palpated [Abnormal Walk] : normal gait [Gait - Sufficient For Exercise Testing] : the gait was sufficient for exercise testing [Nail Clubbing] : no clubbing of the fingernails [Cyanosis, Localized] : no localized cyanosis [Petechial Hemorrhages (___cm)] : no petechial hemorrhages [Skin Color & Pigmentation] : normal skin color and pigmentation [] : no rash [No Venous Stasis] : no venous stasis [Skin Lesions] : no skin lesions [No Skin Ulcers] : no skin ulcer [No Xanthoma] : no  xanthoma was observed [Oriented To Time, Place, And Person] : oriented to person, place, and time [Affect] : the affect was normal [Mood] : the mood was normal [No Anxiety] : not feeling anxious

## 2018-12-19 NOTE — REASON FOR VISIT
[FreeTextEntry1] : Mr. Gregorio returns for evaluation.  He has remained mostly asymptomatic from the cardiac perspective, although he did note isolated episodes of self-limited palpitations at around the time of recent dental work.  Reassured patient that this was probably not related to anesthesia as he had asked and probably not related to a current cardiac condition.  If his symptoms progress, we will investigate further.  We reviewed his recent echocardiogram which appeared normal.  \par \par Reviewed his medications.  His BPs have been borderline.  Will stopp amlodipine/benazapril and leave him on the low dose beta blocker for CAD.  If his BPs increase, may consider adding back the ace inhibitor (for CAD).  At this time, I suspect low dose BB is all that he needs.  He reports compliance with his medications.\par \par Not able to find time for exercise.  Encouraged him to do more.  Has gained about 15-20 lbs since last year.  Seeing Dr. Inés Baez from Endocrine.  Referred him to see Dr. David.\par \par Physical exam was normal. ECG SR.\par \par

## 2018-12-27 DIAGNOSIS — E78.5 HYPERLIPIDEMIA, UNSPECIFIED: ICD-10-CM

## 2018-12-27 DIAGNOSIS — I25.10 ATHEROSCLEROTIC HEART DISEASE OF NATIVE CORONARY ARTERY WITHOUT ANGINA PECTORIS: ICD-10-CM

## 2018-12-27 DIAGNOSIS — I10 ESSENTIAL (PRIMARY) HYPERTENSION: ICD-10-CM

## 2019-01-30 ENCOUNTER — LABORATORY RESULT (OUTPATIENT)
Age: 44
End: 2019-01-30

## 2019-01-30 ENCOUNTER — TRANSCRIPTION ENCOUNTER (OUTPATIENT)
Age: 44
End: 2019-01-30

## 2019-01-30 ENCOUNTER — APPOINTMENT (OUTPATIENT)
Dept: INTERNAL MEDICINE | Facility: CLINIC | Age: 44
End: 2019-01-30
Payer: COMMERCIAL

## 2019-01-30 VITALS
HEART RATE: 62 BPM | DIASTOLIC BLOOD PRESSURE: 68 MMHG | BODY MASS INDEX: 24.48 KG/M2 | SYSTOLIC BLOOD PRESSURE: 110 MMHG | HEIGHT: 67 IN | WEIGHT: 156 LBS | OXYGEN SATURATION: 98 % | TEMPERATURE: 97.7 F

## 2019-01-30 DIAGNOSIS — Z87.19 PERSONAL HISTORY OF OTHER DISEASES OF THE DIGESTIVE SYSTEM: ICD-10-CM

## 2019-01-30 DIAGNOSIS — L71.9 ROSACEA, UNSPECIFIED: ICD-10-CM

## 2019-01-30 DIAGNOSIS — J30.9 ALLERGIC RHINITIS, UNSPECIFIED: ICD-10-CM

## 2019-01-30 DIAGNOSIS — E05.00 THYROTOXICOSIS WITH DIFFUSE GOITER W/OUT THYROTOXIC CRISIS OR STORM: ICD-10-CM

## 2019-01-30 DIAGNOSIS — J34.2 DEVIATED NASAL SEPTUM: ICD-10-CM

## 2019-01-30 DIAGNOSIS — Z78.9 OTHER SPECIFIED HEALTH STATUS: ICD-10-CM

## 2019-01-30 DIAGNOSIS — Z00.00 ENCOUNTER FOR GENERAL ADULT MEDICAL EXAMINATION W/OUT ABNORMAL FINDINGS: ICD-10-CM

## 2019-01-30 DIAGNOSIS — Z71.89 OTHER SPECIFIED COUNSELING: ICD-10-CM

## 2019-01-30 PROCEDURE — 90715 TDAP VACCINE 7 YRS/> IM: CPT

## 2019-01-30 PROCEDURE — 99214 OFFICE O/P EST MOD 30 MIN: CPT | Mod: 25

## 2019-01-30 PROCEDURE — 99386 PREV VISIT NEW AGE 40-64: CPT | Mod: 25

## 2019-01-30 PROCEDURE — 90471 IMMUNIZATION ADMIN: CPT

## 2019-01-30 RX ORDER — AMLODIPINE BESYLATE AND BENAZEPRIL HYDROCHLORIDE 5; 10 MG/1; MG/1
5-10 CAPSULE ORAL
Qty: 90 | Refills: 2 | Status: DISCONTINUED | COMMUNITY
Start: 2019-01-30 | End: 2019-01-30

## 2019-01-30 RX ORDER — AMLODIPINE BESYLATE 10 MG/1
10 TABLET ORAL
Refills: 0 | Status: DISCONTINUED | COMMUNITY

## 2019-01-30 RX ORDER — METRONIDAZOLE 7.5 MG/G
0.75 CREAM TOPICAL TWICE DAILY
Qty: 1 | Refills: 2 | Status: ACTIVE | COMMUNITY
Start: 2019-01-30 | End: 1900-01-01

## 2019-01-30 NOTE — ASSESSMENT
[FreeTextEntry1] : health maintenance - vaccines dtap  he needs  and will be given hand out given risks discussed. he is up to date with eye and dental .  He fu with cardiologist for his cad. HPN he is on medication and presently having no symptoms of hypotension .  His cardiologist had told him to stop amlodipine and benzepril but when he does his bp became elevated.  127/87 135/86 142/97 and then restarted it again.  I discussed decreasing dose chart bp and slowly decreased and remove one if needed.  he has been off it since Monday bur previously came off in Dec and restarted in jan 1.  exophthalmos is present and might also be cause of his eye  asymmetry and muscle weakness of left eye .  He is seeing opthalmology  .  If needed  lubricant drops.  This likely non reversible .  he had ablation over a year ago  and now want to have a baby.  His wife is seeing infertility specialist and he needs to have sperm analysis.  I will refer him to urologist for evaluation.    Rhinitis test for allergies,  deviated septum  no treatment needed now  .   he will stop amlodipine 2.5 mg in 2 weeks if bp is controlled and low and continue with benzepril 5 mg until I see him.  If bp is high on lower amlodipine don’t stop  rosacea \par Rosacea, sometimes called adult acne, is a chronic inflammation of the face of unknown cause and without a permanent cure. Four different types of rosacea have been described:\par \par •"Red face" rosacea, with a tendency to face flushing (or blushing), which can progress to a persistent redness of the nose or central face \par •"Acne"-like bumps and/or pus-filled lesions (papulopustular rosacea), with or without a red face or flushing \par •Rhinophyma – slow enlargement of oil glands and skin thickening of the nose and sometimes other face areas, usually in men \par •Eye problems (ocular rosacea), which may occur before skin changes – a burning or gritty feeling may be present as well as reddening of the eyes and lids\par \par \par  \par Who's at risk?\par \par Adults between 30 and 60 are most often affected by rosacea; it is more common in women and in fair-skinned individuals, although dark-skinned people may also be affected. About 14 million Americans have rosacea. In most people rosacea comes and goes periodically; in some it gets worse with time.\par \par Frequent triggers (things that increase face blood flow) of rosacea include sunlight, hot drinks, spicy foods, alcohol, exercise, hot baths or saunas, temperature extremes, and emotional stress. Prolonged use of cortisone creams on the face can also lead to rosacea. Some drugs may worsen flushing (nasal steroids, amiodarone, high doses of some B vitamins, tamoxifen, and rifampin).\par \par \par  \par Signs and Symptoms\par \par •Persistent or recurrent redness of the central face ("high color," "felisha"), sometimes with swelling \par •Pimples or red bumps on the face (but no blackheads or whiteheads) \par •Red, bulb-shaped nose \par •Small blood vessels (or veins) on the central face \par •Burning or gritty feeling in eyes with or without red eyelids\par Mild rosacea – occasional blushing and/or only rare pimples\par Moderate rosacea – frequent blushing, persistent face redness and/or a few pimples almost all the time\par Severe rosacea – lots of pimples or bumps all the time and/or red, uncomfortable eyes all the time and/or large bulbous nose and/or lots of blood vessels on the face, all of which can be bothersome in appearance\par \par  \par Self-Care Guidelines\par \par Identify and minimize any exposure that triggers episodes of rosacea, such as:\par \par •Sunlight – use a broad-spectrum sunscreen with a sun protection factor (SPF) of 30 or more on your face. \par •Avoid drinking hot liquids and alcohol, eating spicy foods, and excessive heat exposure. \par •Protect the face in winter with a scarf or mask. \par •Avoid facial products with alcohol or other skin irritants (astringents, toners, sorbic acid, menthol, camphor), and use mild cleansers for the face. \par •Fair-skinned people may find a green- or yellow-tinted makeup helps to hide redness. \par •Cool compresses, gel masks, and central face massage may be of some benefit. \par •Benzoyl peroxide may help some people but can also be easily irritating to the skin. \par •Nicomide T is a combination of a vitamin (nicotinamide) and zinc available in cream and gel form, which may be helpful.\par Eye rosacea can be treated with warm water compresses 2–4 times a day followed by gentle cleaning of the eyelid rims with baby shampoo on a Q-tip® and application of the topical antibiotic bacitracin/polymyxin B (but discontinue this if there is any worsening, as there can be rare allergic reactions to bacitracin).\par \par  \par When to Seek Medical Care\par \par If you have severe changes in appearance or symptoms that interfere with your daily life, you should seek care. If you have severe, persistent flushing, there are other possible causes of flushing requiring laboratory tests; see your doctor.\par \par  \par Treatments Your Physician May Prescribe\par \par •Antibiotics - Topical antibiotics include creams or solutions with either metronidazole, erythromycin, sulfur, sodium sulfacetamide, azelaic acid, or benzoyl peroxide. If these are not helpful, oral antibiotics can be very effective (tetracyclines, erythromycin, ampicillin, or metronidazole). \par •Nicomide is a combination pill with niacinamide and zinc, which may help. \par •Isotretinoin is a strong medicine used for very severe cases of rosacea and has many side effects. \par •Surgical treatment with lasers or electrocautery can reduce the visibility of blood vessels and the extra nose tissue in rhinophyma.\par \par \par

## 2019-01-30 NOTE — HEALTH RISK ASSESSMENT
[Excellent] : ~his/her~ current health as excellent [Good] : ~his/her~  mood as  good [No falls in past year] : Patient reported no falls in the past year [0] : 2) Feeling down, depressed, or hopeless: Not at all (0) [HIV Test offered] : HIV Test offered [Hepatitis C test offered] : Hepatitis C test offered [None] : None [With Significant Other] : lives with significant other [# of Members in Household ___] :  household currently consist of [unfilled] member(s) [Employed] : employed [Graduate School] : graduate school [] :  [# Of Children ___] : has [unfilled] children [Sexually Active] : sexually active [Feels Safe at Home] : Feels safe at home [Fully functional (bathing, dressing, toileting, transferring, walking, feeding)] : Fully functional (bathing, dressing, toileting, transferring, walking, feeding) [Fully functional (using the telephone, shopping, preparing meals, housekeeping, doing laundry, using] : Fully functional and needs no help or supervision to perform IADLs (using the telephone, shopping, preparing meals, housekeeping, doing laundry, using transportation, managing medications and managing finances) [Smoke Detector] : smoke detector [Carbon Monoxide Detector] : carbon monoxide detector [Safety elements used in home] : safety elements used in home [Seat Belt] :  uses seat belt [Discussed at today's visit] : Advance Directives Discussed at today's visit [FreeTextEntry1] : trying to get his wife pregnant.   [] : No [de-identified] : social  beer every few months.   [CDK3Compk] : 0 [Change in mental status noted] : No change in mental status noted [Language] : denies difficulty with language [Behavior] : denies difficulty with behavior [Learning/Retaining New Information] : denies difficulty learning/retaining new information [Handling Complex Tasks] : denies difficulty handling complex tasks [Reasoning] : denies difficulty with reasoning [Spatial Ability and Orientation] : denies difficulty with spatial ability and orientation [Reports changes in hearing] : Reports no changes in hearing [Reports changes in vision] : Reports no changes in vision [Reports changes in dental health] : Reports no changes in dental health [Guns at Home] : no guns at home [Sunscreen] : does not use sunscreen [Travel to Developing Areas] : does not  travel to developing areas [TB Exposure] : is not being exposed to tuberculosis [Caregiver Concerns] : does not have caregiver concerns [HIVDate] : 2014 [HIVComments] : neg [HepatitisCDate] : 2014 [HepatitisCComments] : neg  [FreeTextEntry2] :  at [facility] [de-identified] : last eye exam 11/18 [de-identified] : last dental 8/18 has appt this Friday

## 2019-01-30 NOTE — PLAN
[FreeTextEntry1] :  Boostrix 5-2.5-18.5 SUSP\par \par GENERIC NAME:  Diphtheria and Tetanus Toxoids, and Acellular Pertussis Vaccine (dif THEER ee a & TET a nus ZAKI watters, & ay GILLIAN jenifer lar per TUS sis vak SEEN) \par \par COMMON USES:  It is used to prevent diphtheria, tetanus, and pertussis. \par \par HOW TO USE THIS MEDICINE:  HOW IS THIS DRUG BEST TAKEN? Use this drug as ordered by your doctor. Read all information given to you. Follow all instructions closely. It is given as a shot into a muscle. HOW DO I STORE AND/OR THROW OUT THIS DRUG? If you need to store this drug at home, talk with your doctor, nurse, or pharmacist about how to store it. WHAT DO I DO IF I MISS A DOSE? Call your doctor to find out what to do. \par \par CAUTIONS:  Tell all of your health care providers that you take this drug. This includes your doctors, nurses, pharmacists, and dentists. This drug may not protect all people who use it. Talk with the doctor. If you have a latex allergy, talk with your doctor. Not all brands of vaccines are for children. Talk with your child's doctor. Some children may need to have more than 1 dose of this vaccine. Talk with your child's doctor. Tell your doctor if you are pregnant or plan on getting pregnant. You will need to talk about the benefits and risks of using this drug while you are pregnant. Tell your doctor if you are breast-feeding. You will need to talk about any risks to your baby. \par \par POSSIBLE SIDE EFFECTS:  WHAT ARE SOME SIDE EFFECTS THAT I NEED TO CALL MY DOCTOR ABOUT RIGHT AWAY? WARNING/CAUTION: Even though it may be rare, some people may have very bad and sometimes deadly side effects when taking a drug. Tell your doctor or get medical help right away if you have any of the following signs or symptoms that may be related to a very bad side effect: Signs of an allergic reaction, like rash; hives; itching; red, swollen, blistered, or peeling skin with or without fever; wheezing; tightness in the chest or throat; trouble breathing, swallowing, or talking; unusual hoarseness; or swelling of the mouth, face, lips, tongue, or throat. Feeling confused. Very bad dizziness or passing out. Change in eyesight. Seizures. A burning, numbness, or tingling feeling that is not normal. Muscle weakness. Trouble controlling body movements. Very bad irritation where the shot was given. WHAT ARE SOME OTHER SIDE EFFECTS OF THIS DRUG? All drugs may cause side effects. However, many people have no side effects or only have minor side effects. Call your doctor or get medical help if any of these side effects or any other side effects bother you or do not go away: Pain where the shot was given. Redness or swelling where the shot is given. Headache. Feeling tired or weak. Fever or chills. Upset stomach or throwing up. Belly pain. Loose stools (diarrhea). Joint pain or swelling. Swollen gland. Young children: Feeling fussy. Not hungry. Feeling sleepy. Crying that is not normal. These are not all of the side effects that may occur. If you have questions about side effects, call your doctor. Call your doctor for medical advice about side effects. You may report side effects to the FDA at 6-757-ESD-3111. You may also report side effects at http://www.fda.gov/medwatch. http://www.fda.gov/medwatch. \par \par BEFORE USING THIS MEDICINE:  WHAT DO I NEED TO TELL MY DOCTOR BEFORE I TAKE THIS DRUG? TELL YOUR DOCTOR: If you have an allergy to any part of this drug. TELL YOUR DOCTOR: If you are allergic to any drugs like this one, any other drugs, foods, or other substances. Tell your doctor about the allergy and what signs you had, like rash; hives; itching; shortness of breath; wheezing; cough; swelling of face, lips, tongue, or throat; or any other signs. TELL YOUR DOCTOR: If you have seizures or any other brain or nervous system problem. TELL YOUR DOCTOR: If you have had a brain problem like coma, lowered level of awareness, or seizures from an unknown cause within 7 days of a previous vaccine that has pertussis. This is not a list of all drugs or health problems that interact with this drug. Tell your doctor and pharmacist about all of your drugs (prescription or OTC, natural products, vitamins) and health problems. You must check to make sure that it is safe for you to take this drug with all of your drugs and health problems. Do not start, stop, or change the dose of any drug without checking with your doctor. \par \par OVERDOSE:  If you think there has been an overdose, call your poison control center or get medical care right away. Be ready to tell or show what was taken, how much, and when it happened. emergency room (ER) right away. \par \par ADDITIONAL INFORMATION:  If your symptoms or health problems do not get better or if they become worse, call your doctor. Do not share your drugs with others and do not take anyone else's drugs. Keep a list of all your drugs (prescription, natural products, vitamins, OTC) with you. Give this list to your doctor. Talk with the doctor before starting any new drug, including prescription or OTC, natural products, or vitamins. Keep all drugs in a safe place. Keep all drugs out of the reach of children and pets. Throw away unused or  drugs. Do not flush down a toilet or pour down a drain unless you are told to do so. Check with your pharmacist if you have questions about the best way to throw out drugs. There may be drug take-back programs in your area. Some drugs may have another patient information leaflet. Check with your pharmacist. If you have any questions about this drug, please talk with your doctor, nurse, pharmacist, or other health care provider. \par  \par

## 2019-01-30 NOTE — HISTORY OF PRESENT ILLNESS
[FreeTextEntry1] : new pt  [de-identified] : pt is a 43 yr old man who has hx of cad with stent insertion in 8/13 , hypertension on medications, and Graves disease with exophthalmus and treated with radioiodine ablation in Nov 2017.  He follows up regularly with the endocrinologist and cardiologist.  He is feeling well and wants to establish care with new PCP.

## 2019-01-30 NOTE — PHYSICAL EXAM
[Well Developed] : well developed [Well Nourished] : well nourished [Normal Voice Quality] : was normal [Normal Verbal Skills] : the patient had normal verbal communication skills [Normal Nonverbal Skills] : normal nonverbal communication skills were demonstrated [Conjunctiva] : the conjunctiva were normal in both eyes [PERRL] : pupils were equal in size, round, and reactive to light [Normal Outer Ear/Nose] : the outer ears and nose were normal in appearance [Normal Oropharynx] : the oropharynx was normal [Normal TMs] : both tympanic membranes were normal [Normal Appearance] : was normal in appearance [Neck Supple] : was supple [Thyroid Multiple Nodules Right] :  multiple ~M nodules palpated on the right lobe of the thyroid [Thyroid Multiple Nodules Left] :  multiple ~M nodules palpated on the left lobe of the thyroid [Rate ___] : at [unfilled] breaths per minute [Normal Rhythm/Effort] : normal respiratory rhythm and effort [Clear Bilaterally] : the lungs were clear to auscultation bilaterally [Normal to Percussion] : the lungs were normal to percussion [5th Left ICS - MCL] : palpated at the 5th LICS in the midclavicular line [Heart Rate ___] : [unfilled] bpm [Normal Rate] : normal [Normal S1] : normal S1 [Normal S2] : normal S2 [No Murmur] : no murmurs heard [No Pitting Edema] : no pitting edema present [2+] : left 2+ [No Abnormalities] : the abdominal aorta was not enlarged and no bruit was heard [Examination Of The Breasts] : a normal appearance [No Discharge] : no discharge [Soft, Nontender] : the abdomen was soft and nontender [No Mass] : no masses were palpated [No HSM] : no hepatosplenomegaly noted [None] : no CVA tenderness [Urethral Meatus] : meatus normal [Penis Abnormality] : normal circumcised penis [Urinary Bladder Findings] : the bladder was normal on palpation [Scrotum] : the scrotum was normal [Testes Mass (___cm)] : there were no testicular masses [No Lymphangitis] : no lymphangitis observed [Normal Kyphosis] : normal kyphosis [No Visual Abnormalities] : no visible abnormalities [Normal Lordosis] : normal lordosis [No Scoliosis] : no scoliosis [No Tenderness to Palpation] : no spine tenderness on palpation [No Masses] : no masses [Full ROM] : full ROM [No Pain with ROM] : no pain with motion in any direction [Intact] : all reflexes within normal limits bilaterally [Normal Station and Gait] : the gait and station were normal [Normal Motor Tone] : the muscle tone was normal [Involuntary Movements] : no involuntary movements were seen [Normal Scalp] : inspection of the scalp showed no abnormalities [Examination Of The Hair] : texture and distribution of hair was normal [Complexion Medium] : medium complexion [Normal] : the deep tendon reflexes were normal [Normal Mental Status] : the patient's orientation, memory, attention, language and fund of knowledge were normal [Appropriate] : appropriate [Cataract Right Eye] : no cataract was observed in the right eye [Cataract Left Eye] : no cataract was observed in the left eye [Enlarged Diffusely] : was not enlarged [JVP Elevated ___cm] : the JVP was not elevated [S3] : no S3 [S4] : no S4 [Rt] : no varicose veins of the right leg [Lt] : no varicose veins of the left leg [Right Carotid Bruit] : no bruit heard over the right carotid [Left Carotid Bruit] : no bruit heard over the left carotid [Right Femoral Bruit] : no bruit heard over the right femoral artery [Left Femoral Bruit] : no bruit heard over the left femoral artery [Bruit] : no bruit heard [Postauricular Lymph Nodes Enlarged Bilaterally] : nodes not enlarged [Preauricular Lymph Nodes Enlarged Bilaterally] : nodes not enlarged [Submandibular Lymph Nodes Enlarged Bilaterally] : nodes not enlarged [Suboccipital Lymph Nodes Enlarged Bilaterally] : nodes not enlarged [Submental Lymph Nodes Enlarged] : nodes not enlarged [Cervical Lymph Nodes Enlarged Posterior Bilaterally] : nodes not enlarged [Cervical Lymph Nodes Enlarged Anterior Bilaterally] : nodes not enlarged [Supraclavicular Lymph Nodes Enlarged Bilaterally] : nodes not enlarged [Axillary Lymph Nodes Enlarged Bilaterally] : nodes not enlarged [Epitrochlear Lymph Nodes Enlarged Bilaterally] : nodes not enlarged [Femoral Lymph Nodes Enlarged Bilaterally] : nodes not enlarged [Inguinal Lymph Nodes Enlarged Bilaterally] : nodes not enlarged [Abnormal Color] : normal color and pigmentation [Skin Lesions 1] : Skin lesion: [Skin Turgor Decreased] : normal skin turgor [Impaired judgment] : intact judgment [Impaired Insight] : intact insight [de-identified] : deviated septum ot left and erythema of nasal mucosa bilateral  teeth needs repair , tongue normal.   [FreeTextEntry1] : na

## 2019-01-31 LAB
25(OH)D3 SERPL-MCNC: 35.9 NG/ML
ALBUMIN SERPL ELPH-MCNC: 4.9 G/DL
ALP BLD-CCNC: 86 U/L
ALT SERPL-CCNC: 43 U/L
ANION GAP SERPL CALC-SCNC: 11 MMOL/L
APPEARANCE: CLEAR
AST SERPL-CCNC: 27 U/L
BACTERIA: NEGATIVE
BASOPHILS # BLD AUTO: 0.02 K/UL
BASOPHILS NFR BLD AUTO: 0.3 %
BILIRUB SERPL-MCNC: 0.7 MG/DL
BILIRUBIN URINE: NEGATIVE
BLOOD URINE: NEGATIVE
BUN SERPL-MCNC: 10 MG/DL
CALCIUM SERPL-MCNC: 9.7 MG/DL
CHLORIDE SERPL-SCNC: 102 MMOL/L
CHOLEST SERPL-MCNC: 106 MG/DL
CHOLEST/HDLC SERPL: 2.7 RATIO
CO2 SERPL-SCNC: 29 MMOL/L
COLOR: YELLOW
CREAT SERPL-MCNC: 0.68 MG/DL
EOSINOPHIL # BLD AUTO: 0.4 K/UL
EOSINOPHIL NFR BLD AUTO: 5.8 %
GLUCOSE QUALITATIVE U: NEGATIVE MG/DL
GLUCOSE SERPL-MCNC: 80 MG/DL
HBV SURFACE AB SER QL: REACTIVE
HBV SURFACE AG SER QL: NONREACTIVE
HCT VFR BLD CALC: 48.1 %
HCV AB SER QL: REACTIVE
HCV S/CO RATIO: 5.5 S/CO
HDLC SERPL-MCNC: 40 MG/DL
HGB BLD-MCNC: 16 G/DL
HIV1+2 AB SPEC QL IA.RAPID: NONREACTIVE
IMM GRANULOCYTES NFR BLD AUTO: 0.1 %
KETONES URINE: NEGATIVE
LDLC SERPL CALC-MCNC: 49 MG/DL
LEUKOCYTE ESTERASE URINE: NEGATIVE
LYMPHOCYTES # BLD AUTO: 2.19 K/UL
LYMPHOCYTES NFR BLD AUTO: 32 %
MAN DIFF?: NORMAL
MCHC RBC-ENTMCNC: 31.4 PG
MCHC RBC-ENTMCNC: 33.3 GM/DL
MCV RBC AUTO: 94.3 FL
MICROSCOPIC-UA: NORMAL
MONOCYTES # BLD AUTO: 0.53 K/UL
MONOCYTES NFR BLD AUTO: 7.7 %
NEUTROPHILS # BLD AUTO: 3.69 K/UL
NEUTROPHILS NFR BLD AUTO: 54.1 %
NITRITE URINE: NEGATIVE
PH URINE: 7.5
PLATELET # BLD AUTO: 309 K/UL
POTASSIUM SERPL-SCNC: 4.3 MMOL/L
PROT SERPL-MCNC: 8.4 G/DL
PROTEIN URINE: NEGATIVE MG/DL
PSA FREE FLD-MCNC: 22 %
PSA FREE SERPL-MCNC: 0.16 NG/ML
PSA SERPL-MCNC: 0.72 NG/ML
RBC # BLD: 5.1 M/UL
RBC # FLD: 11.4 %
RED BLOOD CELLS URINE: 1 /HPF
SODIUM SERPL-SCNC: 142 MMOL/L
SPECIFIC GRAVITY URINE: 1.02
SQUAMOUS EPITHELIAL CELLS: 0 /HPF
T PALLIDUM AB SER QL IA: NEGATIVE
TRIGL SERPL-MCNC: 84 MG/DL
TSH SERPL-ACNC: 3.18 UIU/ML
UROBILINOGEN URINE: NEGATIVE MG/DL
WBC # FLD AUTO: 6.84 K/UL
WHITE BLOOD CELLS URINE: 0 /HPF

## 2019-02-06 ENCOUNTER — APPOINTMENT (OUTPATIENT)
Dept: UROLOGY | Facility: CLINIC | Age: 44
End: 2019-02-06

## 2019-02-08 LAB
A ALTERNATA IGE QN: <0.1 KUA/L
A FUMIGATUS IGE QN: <0.1 KUA/L
BERMUDA GRASS IGE QN: <0.1 KUA/L
BOXELDER IGE QN: <0.1 KUA/L
C HERBARUM IGE QN: <0.1 KUA/L
CALIF WALNUT IGE QN: <0.1 KUA/L
CAT DANDER IGE QN: <0.1 KUA/L
CMN PIGWEED IGE QN: <0.1 KUA/L
COMMON RAGWEED IGE QN: <0.1 KUA/L
COTTONWOOD IGE QN: <0.1 KUA/L
D FARINAE IGE QN: <0.1 KUA/L
D PTERONYSS IGE QN: <0.1 KUA/L
DEPRECATED A ALTERNATA IGE RAST QL: 0
DEPRECATED A FUMIGATUS IGE RAST QL: 0
DEPRECATED BERMUDA GRASS IGE RAST QL: 0
DEPRECATED BOXELDER IGE RAST QL: 0
DEPRECATED C HERBARUM IGE RAST QL: 0
DEPRECATED CAT DANDER IGE RAST QL: 0
DEPRECATED COMMON PIGWEED IGE RAST QL: 0
DEPRECATED COMMON RAGWEED IGE RAST QL: 0
DEPRECATED COTTONWOOD IGE RAST QL: 0
DEPRECATED D FARINAE IGE RAST QL: 0
DEPRECATED D PTERONYSS IGE RAST QL: 0
DEPRECATED DOG DANDER IGE RAST QL: 0
DEPRECATED GOOSEFOOT IGE RAST QL: 0
DEPRECATED LONDON PLANE IGE RAST QL: 0
DEPRECATED MUGWORT IGE RAST QL: 0
DEPRECATED P NOTATUM IGE RAST QL: 0
DEPRECATED RED CEDAR IGE RAST QL: 0
DEPRECATED ROACH IGE RAST QL: 0
DEPRECATED SHEEP SORREL IGE RAST QL: 0
DEPRECATED SILVER BIRCH IGE RAST QL: 0
DEPRECATED TIMOTHY IGE RAST QL: 0
DEPRECATED WHITE ASH IGE RAST QL: 0
DEPRECATED WHITE OAK IGE RAST QL: 0
DOG DANDER IGE QN: <0.1 KUA/L
GOOSEFOOT IGE QN: <0.1 KUA/L
LONDON PLANE IGE QN: <0.1 KUA/L
MUGWORT IGE QN: <0.1 KUA/L
MULBERRY (T70) CLASS: 0
MULBERRY (T70) CONC: <0.1 KUA/L
P NOTATUM IGE QN: <0.1 KUA/L
RED CEDAR IGE QN: <0.1 KUA/L
ROACH IGE QN: <0.1 KUA/L
SHEEP SORREL IGE QN: <0.1 KUA/L
SILVER BIRCH IGE QN: <0.1 KUA/L
TIMOTHY IGE QN: <0.1 KUA/L
TREE ALLERG MIX1 IGE QL: 0
WHITE ASH IGE QN: <0.1 KUA/L
WHITE ELM IGE QN: 0
WHITE ELM IGE QN: <0.1 KUA/L
WHITE OAK IGE QN: <0.1 KUA/L

## 2019-02-15 ENCOUNTER — APPOINTMENT (OUTPATIENT)
Dept: UROLOGY | Facility: CLINIC | Age: 44
End: 2019-02-15
Payer: COMMERCIAL

## 2019-02-15 VITALS
HEIGHT: 67 IN | SYSTOLIC BLOOD PRESSURE: 112 MMHG | OXYGEN SATURATION: 98 % | RESPIRATION RATE: 15 BRPM | DIASTOLIC BLOOD PRESSURE: 68 MMHG | WEIGHT: 156 LBS | HEART RATE: 57 BPM | BODY MASS INDEX: 24.48 KG/M2

## 2019-02-15 DIAGNOSIS — R31.29 OTHER MICROSCOPIC HEMATURIA: ICD-10-CM

## 2019-02-15 PROCEDURE — 99204 OFFICE O/P NEW MOD 45 MIN: CPT

## 2019-02-15 NOTE — REVIEW OF SYSTEMS
[see HPI] : see HPI [Told you have blood in urine on a urine test] : told blood was present in a urine test [Negative] : Heme/Lymph

## 2019-02-15 NOTE — ASSESSMENT
[FreeTextEntry1] : Very pleasant 43-year-old gentleman who presents for evaluation for microscopic hematuria, primary infertility, erectile dysfunction\par -labs reviewed\par -We discussed that workup for microscopic hematuria typically commences at 3 red blood cells per high-powered field, therefore we will not institute a workup at this time\par -We discussed proper way to have intercourse to maximize fertility potential, around his wife's ovulation\par -Given her advanced age, as well as his age, we will do a workup for fertility\par -Semen analysis\par -LH\par -FSH\par -Testosterone\par -Follow up in 2-3 weeks

## 2019-02-15 NOTE — HISTORY OF PRESENT ILLNESS
[FreeTextEntry1] : Very pleasant 43-year-old gentleman who presents for evaluation of microscopic hematuria, primary infertility, erectile dysfunction. Patient reports that he recently had a urinalysis done which demonstrated one red blood cell per high-power field. He has never seen blood in his urine. He denies dysuria. No urinary urgency or frequency. No other urinary complaints.\par \par Patient reports getting  approximately 5 years ago. Since then, he and his wife has intermittently been trying to have a child. They have not gotten pregnant. He is not bothered a child in the past. He reports that his wife is undergoing treatment at this time for fertility. She has abnormal cycles. They also do not have intercourse very frequently. He is interested in his fertility potential. His wife is 40 years old. Patient also reports difficulty maintaining an erection during intercourse. He does not have difficulty maintaining erection with masturbation. [None] : no symptoms

## 2019-02-15 NOTE — PHYSICAL EXAM
[General Appearance - Well Developed] : well developed [General Appearance - Well Nourished] : well nourished [Normal Appearance] : normal appearance [Well Groomed] : well groomed [General Appearance - In No Acute Distress] : no acute distress [Edema] : no peripheral edema [Respiration, Rhythm And Depth] : normal respiratory rhythm and effort [Exaggerated Use Of Accessory Muscles For Inspiration] : no accessory muscle use [Abdomen Soft] : soft [Abdomen Tenderness] : non-tender [Costovertebral Angle Tenderness] : no ~M costovertebral angle tenderness [Urethral Meatus] : meatus normal [Urinary Bladder Findings] : the bladder was normal on palpation [Scrotum] : the scrotum was normal [Testes Mass (___cm)] : there were no testicular masses [FreeTextEntry1] : Bilateral palpable vas deferens [Normal Station and Gait] : the gait and station were normal for the patient's age [] : no rash [No Focal Deficits] : no focal deficits [Oriented To Time, Place, And Person] : oriented to person, place, and time [Affect] : the affect was normal [Mood] : the mood was normal [Not Anxious] : not anxious [No Palpable Adenopathy] : no palpable adenopathy

## 2019-02-28 ENCOUNTER — OTHER (OUTPATIENT)
Age: 44
End: 2019-02-28

## 2019-03-01 ENCOUNTER — TRANSCRIPTION ENCOUNTER (OUTPATIENT)
Age: 44
End: 2019-03-01

## 2019-03-12 ENCOUNTER — APPOINTMENT (OUTPATIENT)
Dept: UROLOGY | Facility: CLINIC | Age: 44
End: 2019-03-12

## 2019-03-16 ENCOUNTER — INBOUND DOCUMENT (OUTPATIENT)
Age: 44
End: 2019-03-16

## 2019-04-04 ENCOUNTER — APPOINTMENT (OUTPATIENT)
Dept: INTERNAL MEDICINE | Facility: CLINIC | Age: 44
End: 2019-04-04
Payer: COMMERCIAL

## 2019-04-04 ENCOUNTER — LABORATORY RESULT (OUTPATIENT)
Age: 44
End: 2019-04-04

## 2019-04-04 VITALS
BODY MASS INDEX: 24.8 KG/M2 | HEART RATE: 62 BPM | HEIGHT: 67 IN | OXYGEN SATURATION: 98 % | WEIGHT: 158 LBS | DIASTOLIC BLOOD PRESSURE: 64 MMHG | SYSTOLIC BLOOD PRESSURE: 118 MMHG | TEMPERATURE: 98.2 F

## 2019-04-04 DIAGNOSIS — B19.20 UNSPECIFIED VIRAL HEPATITIS C W/OUT HEPATIC COMA: ICD-10-CM

## 2019-04-04 DIAGNOSIS — B19.10 UNSPECIFIED VIRAL HEPATITIS B W/OUT HEPATIC COMA: ICD-10-CM

## 2019-04-04 PROCEDURE — 99214 OFFICE O/P EST MOD 30 MIN: CPT

## 2019-04-04 NOTE — ASSESSMENT
[FreeTextEntry1] : pt had hep c ab  positive and qul negative He has no hx of blood transfusion, Iv drugs.  He has not had sexual encounters other than his spouse.   he will have hep B core and hep A testing.  he will need fu of hep C. he has normal liver enzymes.  he was explained what this is  and presently if he doesn’t have viral detected no further treatment is needed.  HPN-  His bp is low and feels fatigued and will again try to separate his pills and have him chart his bp and bring in.  He needs to follow low salt diet .

## 2019-04-04 NOTE — HISTORY OF PRESENT ILLNESS
[FreeTextEntry1] : hypertension [de-identified] : pt has seen cardiologist and urologist and i appreciate their notes. He has not fu with the testing ordered by Dr coe as of today.  He states he ran out of amlodipine and stopped it He went to amlodipine and benzapril combo He is now he is on amlodipine 5 mg and benzepril 10 .  His bp is controlled.  He had been lowered because of his bp was low.  He had seen Dr berman and told to come off the medication.s .When his pills were  and had bp which was 120s -130 over 70-90.  He is feeling well. He is low on energy.

## 2019-04-04 NOTE — COUNSELING
[Healthy eating counseling provided] : healthy eating [Low Fat Diet] : Low fat diet [Low Salt Diet] : Low salt diet [Sleep ___ hours/day] : Sleep [unfilled] hours/day [None] : None [Good understanding] : Patient has a good understanding of lifestyle changes and the steps needed to achieve self management goals

## 2019-04-04 NOTE — HEALTH RISK ASSESSMENT
[] : No [No falls in past year] : Patient reported no falls in the past year [de-identified] : Dr Luevano, Dr. Chávez [de-identified] : inactive [de-identified] : low salt diet but strict

## 2019-04-05 LAB
ALBUMIN SERPL ELPH-MCNC: 4.6 G/DL
ALP BLD-CCNC: 79 U/L
ALT SERPL-CCNC: 41 U/L
AST SERPL-CCNC: 29 U/L
BILIRUB DIRECT SERPL-MCNC: 0.2 MG/DL
BILIRUB INDIRECT SERPL-MCNC: 0.5 MG/DL
BILIRUB SERPL-MCNC: 0.7 MG/DL
FSH SERPL-MCNC: 2.2 IU/L
HBV CORE IGG+IGM SER QL: NONREACTIVE
HCV AB SER QL: REACTIVE
HCV S/CO RATIO: 6.24 S/CO
HEPATITIS A IGG ANTIBODY: NONREACTIVE
LH SERPL-ACNC: 1.9 IU/L
PROT SERPL-MCNC: 7.4 G/DL

## 2019-04-08 LAB
TESTOST BND SERPL-MCNC: 13 PG/ML
TESTOST SERPL-MCNC: 409 NG/DL

## 2019-04-15 ENCOUNTER — RX RENEWAL (OUTPATIENT)
Age: 44
End: 2019-04-15

## 2019-04-15 ENCOUNTER — APPOINTMENT (OUTPATIENT)
Dept: CARDIOLOGY | Facility: CLINIC | Age: 44
End: 2019-04-15
Payer: COMMERCIAL

## 2019-04-15 VITALS
HEART RATE: 56 BPM | BODY MASS INDEX: 24.8 KG/M2 | WEIGHT: 158 LBS | DIASTOLIC BLOOD PRESSURE: 71 MMHG | OXYGEN SATURATION: 95 % | HEIGHT: 67 IN | SYSTOLIC BLOOD PRESSURE: 113 MMHG | TEMPERATURE: 98.6 F

## 2019-04-15 LAB
T3 SERPL-MCNC: 108 NG/DL
T4 FREE SERPL-MCNC: 1.4 NG/DL
THYROGLOB AB SERPL-ACNC: 36.2 IU/ML
THYROPEROXIDASE AB SERPL IA-ACNC: 123 IU/ML
TSH SERPL-ACNC: 5.15 UIU/ML

## 2019-04-15 PROCEDURE — 99214 OFFICE O/P EST MOD 30 MIN: CPT

## 2019-04-15 PROCEDURE — 93000 ELECTROCARDIOGRAM COMPLETE: CPT

## 2019-04-16 ENCOUNTER — APPOINTMENT (OUTPATIENT)
Dept: ENDOCRINOLOGY | Facility: CLINIC | Age: 44
End: 2019-04-16
Payer: COMMERCIAL

## 2019-04-16 VITALS
HEIGHT: 67 IN | DIASTOLIC BLOOD PRESSURE: 70 MMHG | HEART RATE: 56 BPM | SYSTOLIC BLOOD PRESSURE: 110 MMHG | OXYGEN SATURATION: 98 %

## 2019-04-16 DIAGNOSIS — E89.0 POSTPROCEDURAL HYPOTHYROIDISM: ICD-10-CM

## 2019-04-16 PROCEDURE — 99214 OFFICE O/P EST MOD 30 MIN: CPT

## 2019-04-16 RX ORDER — BENAZEPRIL HYDROCHLORIDE 5 MG/1
5 TABLET ORAL
Qty: 30 | Refills: 0 | Status: ACTIVE | COMMUNITY
Start: 2019-01-30

## 2019-04-16 NOTE — REVIEW OF SYSTEMS
[Fatigue] : no fatigue [Decreased Appetite] : appetite not decreased [Visual Field Defect] : no visual field defect [Blurry Vision] : no blurred vision [Neck Pain] : no neck pain [Nasal Congestion] : no nasal congestion [Shortness Of Breath] : no shortness of breath [Wheezing] : no wheezing was heard [Cough] : no cough [SOB on Exertion] : no shortness of breath during exertion [Nausea] : no nausea [Vomiting] : no vomiting was observed [Constipation] : no constipation [Headache] : no headaches [Diarrhea] : no diarrhea [All other systems negative] : All other systems negative [Tremors] : no tremors [Negative] : ENT

## 2019-04-16 NOTE — HISTORY OF PRESENT ILLNESS
[FreeTextEntry1] : Patient is a 44 yo man with hx of HTN/CAD s/p stents here for follow up of post-ablative hypothyroidism\par \par Patient was diagnosed with hyperthyroidism by his primary care doctor May 2017 when he presented with weight loss, tremors, occasional palpitations and frequent bowel movements. He was found to have positive TSH receptor antibody as well as thyroid uptake scan with 81% uptake consistent with Grave's disease. A thyroid US done in September showed 1.3 cm dominant thyroid nodule on the left and a subcentimeter thyroid nodule on the right. He received 10.3 mCi of radioactive iodine. Patient has Grave's exophthalmos and was evaluated by neuro-ophthalmogist Dr. Maikel Grimm. Patient developed postablative subclinical hypothyroidism with TSH of 8.15 and clinical symptoms of hypothyroidism on August 27, 2108. He was started on levothyroxine 25 mcg and uptitrated to 75 mcg.  TSH 4/12/19 5.15, free T4 1.4\par \par Patient diagnosed with HTN in his 20's, had stent placement as well. On three BP medicines and BP has been at goal. Never had endocrine work up of HTN. Would like to come off of medicines if possible

## 2019-04-16 NOTE — PHYSICAL EXAM
[Alert] : alert [Well Nourished] : well nourished [No Acute Distress] : no acute distress [Well Developed] : well developed [Normal Hearing] : hearing was normal [Thyroid Not Enlarged] : the thyroid was not enlarged [No Thyroid Nodules] : there were no palpable thyroid nodules [No Respiratory Distress] : no respiratory distress [No Accessory Muscle Use] : no accessory muscle use [Normal Rate and Effort] : normal respiratory rhythm and effort [Normal S1, S2] : normal S1 and S2 [Normal Rate] : heart rate was normal  [Clear to Auscultation] : lungs were clear to auscultation bilaterally [Regular Rhythm] : with a regular rhythm [Normal Bowel Sounds] : normal bowel sounds [Not Tender] : non-tender [Soft] : abdomen soft [No Joint Swelling] : no joint swelling seen [Normal Gait] : normal gait [Normal Strength/Tone] : muscle strength and tone were normal [No Rash] : no rash [No Tremors] : no tremors [No Motor Deficits] : the motor exam was normal [Normal Reflexes] : deep tendon reflexes were 2+ and symmetric [Normal Affect] : the affect was normal [Normal Mood] : the mood was normal

## 2019-04-20 ENCOUNTER — APPOINTMENT (OUTPATIENT)
Dept: HUMAN REPRODUCTION | Facility: CLINIC | Age: 44
End: 2019-04-20
Payer: COMMERCIAL

## 2019-04-20 PROCEDURE — 89322 SEMEN ANAL STRICT CRITERIA: CPT

## 2019-04-26 ENCOUNTER — APPOINTMENT (OUTPATIENT)
Dept: UROLOGY | Facility: CLINIC | Age: 44
End: 2019-04-26
Payer: COMMERCIAL

## 2019-04-26 DIAGNOSIS — N46.8 OTHER MALE INFERTILITY: ICD-10-CM

## 2019-04-26 PROCEDURE — 99214 OFFICE O/P EST MOD 30 MIN: CPT

## 2019-04-26 NOTE — PHYSICAL EXAM
[General Appearance - Well Nourished] : well nourished [General Appearance - Well Developed] : well developed [Normal Appearance] : normal appearance [Well Groomed] : well groomed [Abdomen Soft] : soft [General Appearance - In No Acute Distress] : no acute distress [Costovertebral Angle Tenderness] : no ~M costovertebral angle tenderness [Abdomen Tenderness] : non-tender [Urinary Bladder Findings] : the bladder was normal on palpation [Edema] : no peripheral edema [Respiration, Rhythm And Depth] : normal respiratory rhythm and effort [] : no respiratory distress [Exaggerated Use Of Accessory Muscles For Inspiration] : no accessory muscle use [Oriented To Time, Place, And Person] : oriented to person, place, and time [Mood] : the mood was normal [Affect] : the affect was normal [Not Anxious] : not anxious [Normal Station and Gait] : the gait and station were normal for the patient's age [No Focal Deficits] : no focal deficits [No Palpable Adenopathy] : no palpable adenopathy

## 2019-04-26 NOTE — ASSESSMENT
[FreeTextEntry1] : Very pleasant 43-year-old gentleman who presents for followup of primary infertility\par -We reviewed the results of semen analysis in detail\par -Discussed methods to maximize chances of fertility\par -I recommended that he see a dedicated infertility specialist and provided a reference\par -Copy of semen analysis given to patient

## 2019-05-01 NOTE — PHYSICAL EXAM
[General Appearance - Well Developed] : well developed [Normal Appearance] : normal appearance [Well Groomed] : well groomed [General Appearance - Well Nourished] : well nourished [No Deformities] : no deformities [General Appearance - In No Acute Distress] : no acute distress [Normal Conjunctiva] : the conjunctiva exhibited no abnormalities [Eyelids - No Xanthelasma] : the eyelids demonstrated no xanthelasmas [Normal Oral Mucosa] : normal oral mucosa [No Oral Pallor] : no oral pallor [No Oral Cyanosis] : no oral cyanosis [Normal Jugular Venous A Waves Present] : normal jugular venous A waves present [Normal Jugular Venous V Waves Present] : normal jugular venous V waves present [No Jugular Venous Chaves A Waves] : no jugular venous chaves A waves [Respiration, Rhythm And Depth] : normal respiratory rhythm and effort [Exaggerated Use Of Accessory Muscles For Inspiration] : no accessory muscle use [Auscultation Breath Sounds / Voice Sounds] : lungs were clear to auscultation bilaterally [Heart Rate And Rhythm] : heart rate and rhythm were normal [Heart Sounds] : normal S1 and S2 [Murmurs] : no murmurs present [Abdomen Soft] : soft [Abdomen Tenderness] : non-tender [Abdomen Mass (___ Cm)] : no abdominal mass palpated [Abnormal Walk] : normal gait [Gait - Sufficient For Exercise Testing] : the gait was sufficient for exercise testing [Nail Clubbing] : no clubbing of the fingernails [Cyanosis, Localized] : no localized cyanosis [Petechial Hemorrhages (___cm)] : no petechial hemorrhages [Skin Color & Pigmentation] : normal skin color and pigmentation [] : no rash [No Venous Stasis] : no venous stasis [Skin Lesions] : no skin lesions [No Skin Ulcers] : no skin ulcer [No Xanthoma] : no  xanthoma was observed [Oriented To Time, Place, And Person] : oriented to person, place, and time [Mood] : the mood was normal [Affect] : the affect was normal [No Anxiety] : not feeling anxious

## 2019-05-02 NOTE — REASON FOR VISIT
22-Mar-2017 01:01 [FreeTextEntry1] : Mr. Gregorio returns for evaluation.  He has remained mostly asymptomatic from the cardiac perspective, although he did note isolated episodes of self-limited palpitations at around the time of recent dental work.  Reassured patient that this was probably not related to anesthesia as he had asked and probably not related to a current cardiac condition.  If his symptoms progress, we will investigate further.  We reviewed his recent echocardiogram which appeared normal.  \par \par Reviewed his medications.  His BPs have been borderline.  We had attempted to wean him off one of his anti-hypertensive medications on his last visit, but he did not feel well after, and has resumed taking his medications.  Refills provided.  He was normotensive in the office today.\par \par Following with Dr. Inés Baez from Endocrine.  \par Physical exam was normal. ECG SR.\par \par

## 2019-06-09 ENCOUNTER — TRANSCRIPTION ENCOUNTER (OUTPATIENT)
Age: 44
End: 2019-06-09

## 2019-06-09 LAB
T3 SERPL-MCNC: 103 NG/DL
T4 FREE SERPL-MCNC: 1.5 NG/DL
TSH SERPL-ACNC: 1.59 UIU/ML

## 2019-06-26 ENCOUNTER — APPOINTMENT (OUTPATIENT)
Dept: INTERNAL MEDICINE | Facility: CLINIC | Age: 44
End: 2019-06-26

## 2019-06-26 VITALS
BODY MASS INDEX: 24.9 KG/M2 | HEART RATE: 62 BPM | OXYGEN SATURATION: 98 % | SYSTOLIC BLOOD PRESSURE: 101 MMHG | DIASTOLIC BLOOD PRESSURE: 75 MMHG | TEMPERATURE: 98.1 F | WEIGHT: 159 LBS

## 2019-07-01 ENCOUNTER — MEDICATION RENEWAL (OUTPATIENT)
Age: 44
End: 2019-07-01

## 2019-07-01 ENCOUNTER — TRANSCRIPTION ENCOUNTER (OUTPATIENT)
Age: 44
End: 2019-07-01

## 2019-07-22 ENCOUNTER — TRANSCRIPTION ENCOUNTER (OUTPATIENT)
Age: 44
End: 2019-07-22

## 2019-07-30 ENCOUNTER — TRANSCRIPTION ENCOUNTER (OUTPATIENT)
Age: 44
End: 2019-07-30

## 2019-08-12 ENCOUNTER — APPOINTMENT (OUTPATIENT)
Dept: ENDOCRINOLOGY | Facility: CLINIC | Age: 44
End: 2019-08-12

## 2019-08-22 ENCOUNTER — APPOINTMENT (OUTPATIENT)
Dept: ENDOCRINOLOGY | Facility: CLINIC | Age: 44
End: 2019-08-22
Payer: COMMERCIAL

## 2019-08-22 VITALS
HEART RATE: 59 BPM | DIASTOLIC BLOOD PRESSURE: 70 MMHG | SYSTOLIC BLOOD PRESSURE: 102 MMHG | OXYGEN SATURATION: 98 % | WEIGHT: 162 LBS | BODY MASS INDEX: 25.43 KG/M2 | HEIGHT: 67 IN

## 2019-08-22 DIAGNOSIS — E04.1 NONTOXIC SINGLE THYROID NODULE: ICD-10-CM

## 2019-08-22 DIAGNOSIS — E89.0 POSTPROCEDURAL HYPOTHYROIDISM: ICD-10-CM

## 2019-08-22 PROCEDURE — 99214 OFFICE O/P EST MOD 30 MIN: CPT | Mod: 25

## 2019-08-22 PROCEDURE — 76536 US EXAM OF HEAD AND NECK: CPT

## 2019-08-22 NOTE — PHYSICAL EXAM
[Alert] : alert [No Acute Distress] : no acute distress [Well Nourished] : well nourished [Well Developed] : well developed [Normal Sclera/Conjunctiva] : normal sclera/conjunctiva [PERRL] : pupils equal, round and reactive to light [EOMI] : extra ocular movement intact [No Proptosis] : no proptosis [Normal Outer Ear/Nose] : the ears and nose were normal in appearance [Normal TMs] : both tympanic membranes were normal [Normal Hearing] : hearing was normal [No Neck Mass] : no neck mass was observed [Supple] : the neck was supple [Thyroid Not Enlarged] : the thyroid was not enlarged [No Respiratory Distress] : no respiratory distress [Normal Rate and Effort] : normal respiratory rhythm and effort [Clear to Auscultation] : lungs were clear to auscultation bilaterally [Normal Rate] : heart rate was normal  [Normal S1, S2] : normal S1 and S2 [Regular Rhythm] : with a regular rhythm [Normal Bowel Sounds] : normal bowel sounds [Not Tender] : non-tender [Soft] : abdomen soft [Not Distended] : not distended [Normal Gait] : normal gait [No Joint Swelling] : no joint swelling seen [No Clubbing, Cyanosis] : no clubbing  or cyanosis of the fingernails [No Rash] : no rash [No Skin Lesions] : no skin lesions [Normal Reflexes] : deep tendon reflexes were 2+ and symmetric [No Motor Deficits] : the motor exam was normal [No Tremors] : no tremors [Oriented x3] : oriented to person, place, and time [Normal Insight/Judgement] : insight and judgment were intact [Normal Affect] : the affect was normal [Normal Mood] : the mood was normal

## 2019-08-22 NOTE — HISTORY OF PRESENT ILLNESS
[FreeTextEntry1] : 44 year old male with history of Graves disease with post ablative hypothyroidism and previous history of thyroid nodules. \par \par He is on a stable dose of levothyroxine and does not have any overt hyperthyroid symptoms\par No family history of thyroid cancer and no previous compressive symptoms to the neck \par Previous TFTs are stable\par \par Last thyroid ultrasound in 2017 showing a 0.4 x 0.3 x 0.3 cm nodule in the right upper pole and a 1.3 x 0.8 x 1.1 cm nodule on the left lower lobe

## 2019-08-22 NOTE — PROCEDURE
[OrthoFi e 2008 model, 10-12 MHz frequencies] : multiple real time longitudinal and transverse images were obtained using a high resolution ultrasound with a linear transducer, OrthoFi e 2008 model, 10-12 MHz frequencies. All measurements will be reported as longitudinal x lawrence-posterior x transverse. [Thyroid Nodule] : thyroid nodule [] : a homogeneous parenchyma  [No abnormal lymph nodes are seen.] : no abnormal lymph nodes are seen [FreeTextEntry1] : 3.65 x 1.26 x 1.2 [FreeTextEntry5] : 3.33 x 0.85 x 1.34 [FreeTextEntry2] : 0.18

## 2019-08-22 NOTE — IMPRESSION
[FreeTextEntry1] : Small thyroid without presence of thyroid nodules  [FreeTextEntry2] : No further thyroid ultrasound is necessary

## 2019-08-22 NOTE — REVIEW OF SYSTEMS
[Fatigue] : no fatigue [Decreased Appetite] : appetite not decreased [Recent Weight Gain (___ Lbs)] : no recent weight gain [Recent Weight Loss (___ Lbs)] : no recent weight loss [Visual Field Defect] : no visual field defect [Blurry Vision] : no blurred vision [Dry Eyes] : no dryness of the eyes [Eyes Itch] : no itching of the eyes [Dysphagia] : no dysphagia [Dysphonia] : no dysphonia [Chest Pain] : no chest pain [Palpitations] : no palpitations [Heart Rate Is Slow] : the heart rate was not slow [Heart Rate Is Fast] : the heart rate was not fast [Shortness Of Breath] : no shortness of breath [Wheezing] : no wheezing was heard [SOB on Exertion] : no shortness of breath during exertion [Cough] : no cough [Nausea] : no nausea [Vomiting] : no vomiting was observed [Constipation] : no constipation [Diarrhea] : no diarrhea [Polyuria] : no polyuria [Hesitancy] : no hesitancy [Joint Pain] : no joint pain [Joint Stiffness] : no joint stiffness [Muscle Weakness] : no muscle weakness [Acanthosis] : no acanthosis  [Muscle Cramps] : no muscle cramps [Hirsutism] : no hirsutism [Headache] : no headaches [Tremors] : no tremors [Depression] : no depression [Anxiety] : no anxiety [Galactorrhea] : no galactorrhea  [Polydipsia] : no polydipsia [Cold Intolerance] : cold tolerant [Heat Intolerance] : heat tolerant [Easy Bleeding] : no ~M tendency for easy bleeding [Easy Bruising] : no tendency for easy bruising [Swelling] : no swelling

## 2019-08-22 NOTE — REVIEW OF SYSTEMS
[Fatigue] : no fatigue [Decreased Appetite] : appetite not decreased [Recent Weight Gain (___ Lbs)] : no recent weight gain [Recent Weight Loss (___ Lbs)] : no recent weight loss [Visual Field Defect] : no visual field defect [Blurry Vision] : no blurred vision [Dry Eyes] : no dryness of the eyes [Eyes Itch] : no itching of the eyes [Dysphagia] : no dysphagia [Dysphonia] : no dysphonia [Chest Pain] : no chest pain [Palpitations] : no palpitations [Heart Rate Is Slow] : the heart rate was not slow [Heart Rate Is Fast] : the heart rate was not fast [Shortness Of Breath] : no shortness of breath [Wheezing] : no wheezing was heard [SOB on Exertion] : no shortness of breath during exertion [Cough] : no cough [Nausea] : no nausea [Vomiting] : no vomiting was observed [Constipation] : no constipation [Diarrhea] : no diarrhea [Polyuria] : no polyuria [Hesitancy] : no hesitancy [Joint Pain] : no joint pain [Joint Stiffness] : no joint stiffness [Muscle Weakness] : no muscle weakness [Muscle Cramps] : no muscle cramps [Acanthosis] : no acanthosis  [Hirsutism] : no hirsutism [Headache] : no headaches [Tremors] : no tremors [Depression] : no depression [Anxiety] : no anxiety [Polydipsia] : no polydipsia [Galactorrhea] : no galactorrhea  [Cold Intolerance] : cold tolerant [Heat Intolerance] : heat tolerant [Easy Bleeding] : no ~M tendency for easy bleeding [Easy Bruising] : no tendency for easy bruising [Swelling] : no swelling

## 2019-08-22 NOTE — PROCEDURE
[[employer] e 2008 model, 10-12 MHz frequencies] : multiple real time longitudinal and transverse images were obtained using a high resolution ultrasound with a linear transducer, [employer] e 2008 model, 10-12 MHz frequencies. All measurements will be reported as longitudinal x lawrence-posterior x transverse. [Thyroid Nodule] : thyroid nodule [] : a homogeneous parenchyma  [No abnormal lymph nodes are seen.] : no abnormal lymph nodes are seen [FreeTextEntry1] : 3.65 x 1.26 x 1.2 [FreeTextEntry5] : 3.33 x 0.85 x 1.34 [FreeTextEntry2] : 0.18

## 2019-08-22 NOTE — ASSESSMENT
[FreeTextEntry1] : 44 year old male with history of postablative hypothyroidism and thyroid nodules here for follow up\par \par Thyroid nodules \par -Thyroid ultrasound today not showing presence of any discernible nodules \par -No further thyroid ultrasound follow up necessary \par \par Hypothyroidism\par -Continue same dose of levothyroxine \par -TFTs within normal limits \par \par Patient will be moving to [place] and will find an endocrinologist there

## 2019-08-26 ENCOUNTER — APPOINTMENT (OUTPATIENT)
Dept: CARDIOLOGY | Facility: CLINIC | Age: 44
End: 2019-08-26

## 2019-10-08 ENCOUNTER — APPOINTMENT (OUTPATIENT)
Dept: ENDOCRINOLOGY | Facility: CLINIC | Age: 44
End: 2019-10-08

## 2020-01-01 NOTE — ASSESSMENT
[FreeTextEntry1] : Patient is a 44 yo man with post-ablative hypothyroidism, thyroid nodule and HTN\par \par 1. Postablative hypothyroid\par -clinically euthyroid, some fatigue remains\par -TSH mildly elevated, room to increase Lt4 to 75 mcg\par -repeat TFTs in 2 months\par \par 2. Thyroid nodule\par -surveillance thyroid US this year\par \par 3. HTN\par -patient with no hypokalemia, has been on multiple BP meds since early 20's.  \par -check metanephrines and sadiq/renin screen for endocrine HTN.  Can consider renal work up. Will discuss with cardiology\par -he would like to decrease the amount of medicines he takes to optimize quality of life\par \par Follow up for thyroid US and MD visit as recommended [Levothyroxine] : The patient was instructed to take Levothyroxine on an empty stomach, separate from vitamins, and wait at least 30 minutes before eating (2) good, crying

## 2020-02-23 ENCOUNTER — RX RENEWAL (OUTPATIENT)
Age: 45
End: 2020-02-23

## 2020-03-23 ENCOUNTER — RX RENEWAL (OUTPATIENT)
Age: 45
End: 2020-03-23

## 2020-05-11 ENCOUNTER — TRANSCRIPTION ENCOUNTER (OUTPATIENT)
Age: 45
End: 2020-05-11

## 2020-05-12 ENCOUNTER — APPOINTMENT (OUTPATIENT)
Dept: CARDIOLOGY | Facility: CLINIC | Age: 45
End: 2020-05-12
Payer: COMMERCIAL

## 2020-05-12 VITALS — BODY MASS INDEX: 24.8 KG/M2 | WEIGHT: 158 LBS | HEIGHT: 67 IN

## 2020-05-12 VITALS — SYSTOLIC BLOOD PRESSURE: 101 MMHG | HEART RATE: 63 BPM | DIASTOLIC BLOOD PRESSURE: 66 MMHG

## 2020-05-12 DIAGNOSIS — E05.00 THYROTOXICOSIS WITH DIFFUSE GOITER W/OUT THYROTOXIC CRISIS OR STORM: ICD-10-CM

## 2020-05-12 DIAGNOSIS — E78.5 HYPERLIPIDEMIA, UNSPECIFIED: ICD-10-CM

## 2020-05-12 DIAGNOSIS — I25.10 ATHEROSCLEROTIC HEART DISEASE OF NATIVE CORONARY ARTERY W/OUT ANGINA PECTORIS: ICD-10-CM

## 2020-05-12 DIAGNOSIS — I10 ESSENTIAL (PRIMARY) HYPERTENSION: ICD-10-CM

## 2020-05-12 PROCEDURE — 99214 OFFICE O/P EST MOD 30 MIN: CPT | Mod: 95

## 2020-05-12 RX ORDER — AMLODIPINE BESYLATE 2.5 MG/1
2.5 TABLET ORAL
Qty: 90 | Refills: 3 | Status: ACTIVE | COMMUNITY
Start: 2019-01-30 | End: 1900-01-01

## 2020-05-12 RX ORDER — BENAZEPRIL HYDROCHLORIDE 10 MG/1
10 TABLET, FILM COATED ORAL
Qty: 90 | Refills: 3 | Status: ACTIVE | COMMUNITY
Start: 2019-01-30 | End: 1900-01-01

## 2020-05-12 RX ORDER — LEVOTHYROXINE SODIUM 0.07 MG/1
75 TABLET ORAL DAILY
Qty: 1 | Refills: 3 | Status: ACTIVE | COMMUNITY
Start: 2018-08-28 | End: 1900-01-01

## 2021-06-07 NOTE — ED ADULT TRIAGE NOTE - PAIN RATING/NUMBER SCALE (0-10): ACTIVITY
Pt presents for BP check.   Manual cuff: 126/74, HR 76  Home auto cuff: 128/76     Pt reports he was getting high readings, nothing was consistent. He's taking BP med consistently daily.   When he showed writer how he puts cuff on it was noted that he has not been applying the cuff correctly - puts the artery marker in the wrong spot. Explained that this is likely why he had high readings. In fact his first reading on his cuff was 140's 80's but that was when it was on incorrectly.   Also discussed how he should not talk while checking BP. Sit quietly for 2-5 minutes before checking and make sure he is in a neutral body position with ankles uncrossed.   Pt wanted to check once more to be sure.   Manual - 120/78   Auto - 128/79     Assured pt his cuff is pretty accurate. Will update PCP.    10
